# Patient Record
Sex: FEMALE | Race: BLACK OR AFRICAN AMERICAN
[De-identification: names, ages, dates, MRNs, and addresses within clinical notes are randomized per-mention and may not be internally consistent; named-entity substitution may affect disease eponyms.]

---

## 2018-09-18 ENCOUNTER — HOSPITAL ENCOUNTER (INPATIENT)
Dept: HOSPITAL 54 - GPS | Age: 68
LOS: 9 days | Discharge: HOME | DRG: 885 | End: 2018-09-27
Admitting: PSYCHIATRY & NEUROLOGY
Payer: COMMERCIAL

## 2018-09-18 VITALS — HEIGHT: 67 IN | BODY MASS INDEX: 20.72 KG/M2 | WEIGHT: 132 LBS

## 2018-09-18 DIAGNOSIS — F41.9: ICD-10-CM

## 2018-09-18 DIAGNOSIS — I10: ICD-10-CM

## 2018-09-18 DIAGNOSIS — Z88.0: ICD-10-CM

## 2018-09-18 DIAGNOSIS — Z59.0: ICD-10-CM

## 2018-09-18 DIAGNOSIS — E78.5: ICD-10-CM

## 2018-09-18 DIAGNOSIS — Z91.14: ICD-10-CM

## 2018-09-18 DIAGNOSIS — Z22.322: ICD-10-CM

## 2018-09-18 DIAGNOSIS — F01.50: ICD-10-CM

## 2018-09-18 DIAGNOSIS — R45.1: ICD-10-CM

## 2018-09-18 DIAGNOSIS — F25.0: Primary | ICD-10-CM

## 2018-09-18 PROCEDURE — A6402 STERILE GAUZE <= 16 SQ IN: HCPCS

## 2018-09-18 PROCEDURE — Z7610: HCPCS

## 2018-09-18 SDOH — ECONOMIC STABILITY - HOUSING INSECURITY: HOMELESSNESS: Z59.0

## 2018-09-19 VITALS — SYSTOLIC BLOOD PRESSURE: 100 MMHG | DIASTOLIC BLOOD PRESSURE: 52 MMHG

## 2018-09-19 VITALS — SYSTOLIC BLOOD PRESSURE: 162 MMHG | DIASTOLIC BLOOD PRESSURE: 82 MMHG

## 2018-09-19 VITALS — DIASTOLIC BLOOD PRESSURE: 72 MMHG | SYSTOLIC BLOOD PRESSURE: 133 MMHG

## 2018-09-19 VITALS — DIASTOLIC BLOOD PRESSURE: 82 MMHG | SYSTOLIC BLOOD PRESSURE: 168 MMHG

## 2018-09-19 LAB
ALBUMIN SERPL BCP-MCNC: 3.3 G/DL (ref 3.4–5)
ALP SERPL-CCNC: 93 U/L (ref 46–116)
ALT SERPL W P-5'-P-CCNC: 31 U/L (ref 12–78)
AST SERPL W P-5'-P-CCNC: 41 U/L (ref 15–37)
BILIRUB SERPL-MCNC: 0.2 MG/DL (ref 0.2–1)
BUN SERPL-MCNC: 21 MG/DL (ref 7–18)
CALCIUM SERPL-MCNC: 9.3 MG/DL (ref 8.5–10.1)
CHLORIDE SERPL-SCNC: 102 MMOL/L (ref 98–107)
CO2 SERPL-SCNC: 31 MMOL/L (ref 21–32)
CREAT SERPL-MCNC: 0.7 MG/DL (ref 0.6–1.3)
GLUCOSE SERPL-MCNC: 83 MG/DL (ref 74–106)
POTASSIUM SERPL-SCNC: 4.5 MMOL/L (ref 3.5–5.1)
PROT SERPL-MCNC: 7.6 G/DL (ref 6.4–8.2)
SODIUM SERPL-SCNC: 138 MMOL/L (ref 136–145)

## 2018-09-19 RX ADMIN — TEMAZEPAM PRN MG: 7.5 CAPSULE ORAL at 22:19

## 2018-09-19 RX ADMIN — DIVALPROEX SODIUM SCH MG: 250 TABLET, DELAYED RELEASE ORAL at 19:47

## 2018-09-19 RX ADMIN — LORAZEPAM PRN MG: 0.5 TABLET ORAL at 20:37

## 2018-09-19 RX ADMIN — BENZTROPINE MESYLATE SCH MG: 1 TABLET ORAL at 13:04

## 2018-09-19 RX ADMIN — BENZTROPINE MESYLATE SCH MG: 1 TABLET ORAL at 16:21

## 2018-09-19 RX ADMIN — ALUMINUM HYDROXIDE, MAGNESIUM HYDROXIDE, AND SIMETHICONE PRN ML: 200; 200; 20 SUSPENSION ORAL at 19:31

## 2018-09-19 RX ADMIN — LORAZEPAM PRN MG: 0.5 TABLET ORAL at 10:07

## 2018-09-19 RX ADMIN — DIVALPROEX SODIUM SCH MG: 250 TABLET, DELAYED RELEASE ORAL at 13:00

## 2018-09-19 NOTE — NUR
ADMISSION NOTE:



ADMITTED DIRECTLY FROM [facility], BROUGHT IN BY PARAMEDICS, ADMITTED ON 5250 
FOR GD, PATIENT HAS NO PLAN TO PROVIDE FOR SHELTER, FOOD AND CLOTHING. PATIENT IS HOMELESS. 
PATIENT WAS PLACED ON THE DIONTE CHAIR DUE TO AGGRESSIVE BEHAVIOR, VERBALLY ABUSIVE, 
UNPREDICTABLE, LOUD. AWAKE, ALERT, ORIENTED X3-4, AMBULATORY, SKIN INTACT, RESPIRATION EVEN, 
BREATHING PATTERN NON-LABORED, NO ACUTE DISTRESS NOTED. BELONGINGS WERE INVENTORIED AND 
CHECKED FOR CONTRABAND. PATIENT IS UNDER THE PSYCHIATRIC CARE OF DR. UNGER AND MEDICAL 
CARE OF DR. MAY. MRSA SCREEN DONE. NO HOME MEDS REPORTED. NO FAMILY TO NOTIFY, PATIENT 
LIVES ALONE. BED LOCKED AND PLACED ON LOWEST POSITION. WILL CONTINUE TO MONITOR Q 15 MINS. 
TO MAINTAIN SAFETY.

## 2018-09-19 NOTE — NUR
JOSE STATED THAT MED RECON NOT DONE RIGHT. TOLD US TO FIND WHAT MEDS SHE IS ON. WE 
CALLED [facility] MEDICAL RECORD AND LEFT A MESSAGE. CALLED THE FACILITY SHE WAS AT AND 
LEFT A MESSAGE. WAITING FOR BOTH TO CALL BACK.

## 2018-09-19 NOTE — NUR
gps/rn notes

requested for medication to help sleep.alert, oriented, able to verbalize needs, in bed,

## 2018-09-19 NOTE — NUR
GPS/RN OPENING NOTES

RECEIVED PATIENT ALERT, AWAKE, AMBULATES NAD ABLE TO VERBALIZE NEEDS AT ALL TIMES, PROVIDED 
FLUIDS AND REFUSES TO TAKE DEPAKOTE PO MEDS BUT REQUESTED TO HAVE SOME MEDICATION MAALOX FOR 
STOMACH UPSET/GAS. RESPIRATIONS EVEN AND UNLABORED. SKIN WARM TO TOUCH. BEHAVIOR WITH NO S/S 
OF DISTRESS, RIGHT EYE BLIND, REQUIRE MONITORING, PATIENT DENIES PAIN AT THIS TIME, ATTEND 
TO NEEDS. WILL CONTINUE TO MONITOR, INSTRUCTED TO CALL FOR HELP.

## 2018-09-20 VITALS — SYSTOLIC BLOOD PRESSURE: 108 MMHG | DIASTOLIC BLOOD PRESSURE: 72 MMHG

## 2018-09-20 VITALS — SYSTOLIC BLOOD PRESSURE: 125 MMHG | DIASTOLIC BLOOD PRESSURE: 58 MMHG

## 2018-09-20 VITALS — SYSTOLIC BLOOD PRESSURE: 107 MMHG | DIASTOLIC BLOOD PRESSURE: 61 MMHG

## 2018-09-20 LAB
CHOLEST SERPL-MCNC: 169 MG/DL (ref ?–200)
HDLC SERPL-MCNC: 69 MG/DL (ref 40–60)
LDLC SERPL DIRECT ASSAY-MCNC: 104 MG/DL (ref 0–99)
TRIGL SERPL-MCNC: 59 MG/DL (ref 30–150)

## 2018-09-20 RX ADMIN — BENZTROPINE MESYLATE SCH MG: 1 TABLET ORAL at 16:35

## 2018-09-20 RX ADMIN — BENZTROPINE MESYLATE SCH MG: 1 TABLET ORAL at 07:51

## 2018-09-20 RX ADMIN — ALUMINUM HYDROXIDE, MAGNESIUM HYDROXIDE, AND SIMETHICONE PRN ML: 200; 200; 20 SUSPENSION ORAL at 04:02

## 2018-09-20 RX ADMIN — BENZTROPINE MESYLATE SCH MG: 1 TABLET ORAL at 13:21

## 2018-09-20 RX ADMIN — OXCARBAZEPINE SCH MG: 150 TABLET, FILM COATED ORAL at 16:35

## 2018-09-20 RX ADMIN — LORAZEPAM PRN MG: 0.5 TABLET ORAL at 04:40

## 2018-09-20 RX ADMIN — DIVALPROEX SODIUM SCH MG: 250 TABLET, DELAYED RELEASE ORAL at 09:00

## 2018-09-20 RX ADMIN — ACETAMINOPHEN PRN MG: 325 TABLET ORAL at 05:22

## 2018-09-20 RX ADMIN — LORAZEPAM PRN MG: 0.5 TABLET ORAL at 10:56

## 2018-09-20 RX ADMIN — LORAZEPAM PRN MG: 0.5 TABLET ORAL at 19:54

## 2018-09-20 RX ADMIN — MUPIROCIN SCH GM: 20 OINTMENT TOPICAL at 21:25

## 2018-09-20 RX ADMIN — OXCARBAZEPINE SCH MG: 150 TABLET, FILM COATED ORAL at 10:54

## 2018-09-20 NOTE — NUR
gps/rn notes

patient observe awake, verbalize needs to eat some snacks, informed and made aware about tv 
watching, coopertaive and able to do some pacing and reading but communicates with staff and 
charge nurse about opinions and feelings. discussed the plan for her to have her transfered 
to another room private by charge nurse and supervisor decision, made aware and agreed for 
the scott. requeted to have ativan as patient appears/observe inability to relax.

## 2018-09-20 NOTE — NUR
GPS/RN NOTES

PATIENT COMPLAINING OF BEING CONSTIPATED, REPORTED HAVING HARD STOOL AND DO NOT WANT TO BE 
CONSTIPATED, EDUCATED THE CONS/PROS OF TAKING MOM FOR RELIEF OF CONSTIPATION WITH NO BM FOR 
MORE THAN 3 DAYS. REQUESTED TO HAVE IT AS SHE REPORTED BEING CONSTIPATED.

## 2018-09-20 NOTE — NUR
RN NOTES



PATIENT ADMINISTERED HALOPERIDOL 2MG IM ON HER LEFT DELTOID AT 1130. BP /74 MMHG. 
WILL CONTINUE TO MONITOR.

## 2018-09-20 NOTE — NUR
RN NOTES



DR GARCIA  CAME TO UNIT AND INFORMED THAT PT IS POSITIVE FOR MRSA OF BOTH NARES. HE 
ORDERED TO START PT ON BACTROBAN OINTMENT TO  BOTH NARES Q 12HRS. WILL CONTINUE TO MONITOR

## 2018-09-20 NOTE — NUR
GPS/RN CLOSING NOTES



PATIENT ASLEEP IN BED AT THIS TIME, EASILY AWAKENS. 1:1 SITTER AT BEDSIDE. PT IS A/O X 2-3, 
SAME ABLE TO VERBALIZED NEEDS. DENIES HI/SI DURING SHIFT. ALL NEEDS AND CARE ATTENDED WELL. 
ON ROOM AIR, RESPIRATIONS EVEN AND UNLABORED, SKIN WARM TO TOUCH. ALL SAFETY MEASURES KEPT 
IN PLACE. BED IN LOW/LOCKED POSITION WITH SIDE RAILS UP 2X. WILL ENDORSE TO NIGHT SHIFT 
NURSE FOR SHERI.

## 2018-09-20 NOTE — NUR
GPS NOTES



RECEIVE PT FROM GPS 0701 WITH 1 RN AND 1:1 SITTER NURSE, TRANSFER TO ROOM 209 -1 GPS 
OVERFLOW. PT A/O X2, VERBALLY ABUSIVE, EDUCATED AND RE ORIENT PT. RESPIRATIONS EVEN AND 
UNLABORED. NOT IN DISTRESS, PT AMBULATORY, SAFETY MEASURES IN PLACE. WILL ENDORSE NEXT SHIFT 
CONTINUITY OF CARE

## 2018-09-20 NOTE — NUR
RN GPS NOTES



PATIENT ARRIVED TO UNIT @ 0701 AMBULATORY FROM GPS ACCOMPANIED BY GPS NURSE AND SITTER. 
ALERT AND ORIENTED X 2, CONFUSED. PT RE-ORIENTED TO UNIT, ROOM  AND STAFF. ON ROOM AIR, 
BREATHING EVEN AND UNLABORED. ALL SAFETY MEASURES IN PLACE. WILL CONTINUE TO MONITOR 
PATIENT.

## 2018-09-20 NOTE — NUR
PT WAS CONSTANTLY SCREAMING LOUDLY IN THE HALLWAY-VERBALLY ABUSIVE TO THE STAFF MAKING 
STATEMENTS THAT DOESN'T MAKE SENSE.CONSTANT SCREAMING -PT'S ROOMMATE WAS GETTING SICK AND 
HAVING FEVER AS WELL.ROOMMATE NEEDS TO BE TRANSFERRED TO A DIFFERENT ROOM.CALLED DR EUGENE 
AND MADE AWARE WITH ORDERS FOR HALDOL 2 MG IM X1.

## 2018-09-20 NOTE — NUR
INITIAL DISCHARGE PLAN: Patient wishes to be discharged to [facility] Address: [address], [place] [address] Phone: [phone]. KOKO will 
help form a safe and proper discharge in collaboration with MD.

## 2018-09-20 NOTE — NUR
RN NOTES



RECEIVED CALL FROM ROCHELLE BARROSO OF [facility] AND WAS 
INFORMED THAT PT IS POSITIVE OF MRSA OF BOTH NARES. CONTACT ISOLATION STARTED. WILL CONTINUE 
TO MONITOR.

## 2018-09-20 NOTE — NUR
gps/rn notes

patient was trasfered to [facility] floor 2 by cna and charge nurse and supervisor made aware 
as planned and agreed by patient. ambulatory. one on one sitter with patient for safety.

## 2018-09-21 VITALS — SYSTOLIC BLOOD PRESSURE: 118 MMHG | DIASTOLIC BLOOD PRESSURE: 62 MMHG

## 2018-09-21 VITALS — DIASTOLIC BLOOD PRESSURE: 69 MMHG | SYSTOLIC BLOOD PRESSURE: 129 MMHG

## 2018-09-21 RX ADMIN — LORAZEPAM PRN MG: 0.5 TABLET ORAL at 17:39

## 2018-09-21 RX ADMIN — LORAZEPAM PRN MG: 0.5 TABLET ORAL at 03:52

## 2018-09-21 RX ADMIN — TEMAZEPAM PRN MG: 7.5 CAPSULE ORAL at 00:36

## 2018-09-21 RX ADMIN — BENZTROPINE MESYLATE SCH MG: 1 TABLET ORAL at 07:51

## 2018-09-21 RX ADMIN — ACETAMINOPHEN PRN MG: 325 TABLET ORAL at 02:05

## 2018-09-21 RX ADMIN — LORAZEPAM PRN MG: 0.5 TABLET ORAL at 10:29

## 2018-09-21 RX ADMIN — BENZTROPINE MESYLATE SCH MG: 1 TABLET ORAL at 12:29

## 2018-09-21 RX ADMIN — TEMAZEPAM PRN MG: 7.5 CAPSULE ORAL at 21:56

## 2018-09-21 RX ADMIN — MUPIROCIN SCH GM: 20 OINTMENT TOPICAL at 08:45

## 2018-09-21 RX ADMIN — BENZTROPINE MESYLATE SCH MG: 1 TABLET ORAL at 17:39

## 2018-09-21 RX ADMIN — ACETAMINOPHEN PRN MG: 325 TABLET ORAL at 01:37

## 2018-09-21 RX ADMIN — MUPIROCIN SCH GM: 20 OINTMENT TOPICAL at 21:57

## 2018-09-21 RX ADMIN — OXCARBAZEPINE SCH MG: 150 TABLET, FILM COATED ORAL at 17:39

## 2018-09-21 RX ADMIN — OXCARBAZEPINE SCH MG: 150 TABLET, FILM COATED ORAL at 08:44

## 2018-09-21 RX ADMIN — ACETAMINOPHEN PRN MG: 325 TABLET ORAL at 14:43

## 2018-09-21 NOTE — NUR
RN NOTES



PATIENT  RESTLESS, TALKING LOUDLY  AND ANXIOUS. ATIVAN 0.5MG TAB GIVEN AT 1029. WILL 
CONTINUE TO MONITOR.

## 2018-09-21 NOTE — NUR
RN NOTES



PATIENT TRANSFERRED FROM M/S 2 ROOM 209-1 TO GPS ROOM 217-1 VIA WHEELCHAIR AT 1805. REPORT 
GIVEN TO NURSE AUGUSTE.

## 2018-09-21 NOTE — NUR
RN PAIN MANAGEMENT



PATIENT COMPLAINED OF MILD HEADACHE AND REQUESTED FOR TYLENOL. PRN TYLENOL 650 MG PO GIVEN 
AT  [address]. WILL CONTINUE TO MONITOR

## 2018-09-21 NOTE — NUR
GPS/RN-NOTES

RECEIVED REPORT FROM ALTHEA (RN) MS2 OVERFLOW. PATIENT AWAKE,ALERT/ORIENTED X3 UP IN THE 
WHEELCHAIR.PATIENT ABLE TO AMBULATE WITH STEADY GAIT. ALL BELONGINGS WAS PUT IN THE HIS  
CLOSET.

## 2018-09-21 NOTE — NUR
RN NOTES



PATIENT  NOTED ANXIOUS AND AGITATED. ATIVAN 0.5MG TAB GIVEN AT 1739. WILL CONTINUE TO 
MONITOR.

## 2018-09-21 NOTE — NUR
GPS/RN OPENING NOTES



PATIENT RECEIVED AWAKE AND WALKING AROUND IN HER ROOM. 1:1 SITTER PRESENT IN ROOM. A/O X 
2-3. ABLE TO COMMUNICATE VERBALLY, DENIES PAIN OR DISCOMFORTS AT THIS TIME. DENIES HI/SI. ON 
ROOM AIR, RESPIRATIONS EVEN AND UNLABORED, SKIN WARM TO TOUCH. CONTACT PRECAUTIONS FOR MRSA 
OF NARES MAINTAINED. ALL SAFETY MEASURES IN PLACE. BED IN LOW/LOCKED POSITION WITH SIDE 
RAILS UP 2X. WILL CONTINUE TO MONITOR PT.

## 2018-09-22 VITALS — DIASTOLIC BLOOD PRESSURE: 55 MMHG | SYSTOLIC BLOOD PRESSURE: 136 MMHG

## 2018-09-22 VITALS — DIASTOLIC BLOOD PRESSURE: 91 MMHG | SYSTOLIC BLOOD PRESSURE: 154 MMHG

## 2018-09-22 VITALS — DIASTOLIC BLOOD PRESSURE: 57 MMHG | SYSTOLIC BLOOD PRESSURE: 119 MMHG

## 2018-09-22 RX ADMIN — BENZTROPINE MESYLATE SCH MG: 1 TABLET ORAL at 13:19

## 2018-09-22 RX ADMIN — MUPIROCIN SCH GM: 20 OINTMENT TOPICAL at 11:22

## 2018-09-22 RX ADMIN — TEMAZEPAM PRN MG: 7.5 CAPSULE ORAL at 23:47

## 2018-09-22 RX ADMIN — BENZTROPINE MESYLATE SCH MG: 1 TABLET ORAL at 08:34

## 2018-09-22 RX ADMIN — ATORVASTATIN CALCIUM SCH MG: 10 TABLET, FILM COATED ORAL at 22:25

## 2018-09-22 RX ADMIN — ALUMINUM HYDROXIDE, MAGNESIUM HYDROXIDE, AND SIMETHICONE PRN ML: 200; 200; 20 SUSPENSION ORAL at 03:16

## 2018-09-22 RX ADMIN — OXCARBAZEPINE SCH MG: 150 TABLET, FILM COATED ORAL at 08:35

## 2018-09-22 RX ADMIN — MUPIROCIN SCH GM: 20 OINTMENT TOPICAL at 22:29

## 2018-09-22 RX ADMIN — LORAZEPAM PRN MG: 0.5 TABLET ORAL at 03:09

## 2018-09-22 RX ADMIN — BENZTROPINE MESYLATE SCH MG: 1 TABLET ORAL at 17:53

## 2018-09-22 RX ADMIN — OXCARBAZEPINE SCH MG: 150 TABLET, FILM COATED ORAL at 17:53

## 2018-09-22 RX ADMIN — OXCARBAZEPINE SCH MG: 150 TABLET, FILM COATED ORAL at 13:18

## 2018-09-23 VITALS — DIASTOLIC BLOOD PRESSURE: 73 MMHG | SYSTOLIC BLOOD PRESSURE: 121 MMHG

## 2018-09-23 VITALS — SYSTOLIC BLOOD PRESSURE: 124 MMHG | DIASTOLIC BLOOD PRESSURE: 49 MMHG

## 2018-09-23 VITALS — SYSTOLIC BLOOD PRESSURE: 138 MMHG | DIASTOLIC BLOOD PRESSURE: 82 MMHG

## 2018-09-23 RX ADMIN — OXCARBAZEPINE SCH MG: 150 TABLET, FILM COATED ORAL at 12:58

## 2018-09-23 RX ADMIN — BENZTROPINE MESYLATE SCH MG: 1 TABLET ORAL at 17:03

## 2018-09-23 RX ADMIN — OXCARBAZEPINE SCH MG: 150 TABLET, FILM COATED ORAL at 17:03

## 2018-09-23 RX ADMIN — ALUMINUM HYDROXIDE, MAGNESIUM HYDROXIDE, AND SIMETHICONE PRN ML: 200; 200; 20 SUSPENSION ORAL at 09:00

## 2018-09-23 RX ADMIN — LORAZEPAM PRN MG: 0.5 TABLET ORAL at 09:00

## 2018-09-23 RX ADMIN — MUPIROCIN SCH GM: 20 OINTMENT TOPICAL at 09:01

## 2018-09-23 RX ADMIN — BENZTROPINE MESYLATE SCH MG: 1 TABLET ORAL at 12:10

## 2018-09-23 RX ADMIN — OXCARBAZEPINE SCH MG: 150 TABLET, FILM COATED ORAL at 09:00

## 2018-09-23 RX ADMIN — TEMAZEPAM PRN MG: 7.5 CAPSULE ORAL at 22:02

## 2018-09-23 RX ADMIN — OXCARBAZEPINE SCH MG: 150 TABLET, FILM COATED ORAL at 12:10

## 2018-09-23 RX ADMIN — LORAZEPAM PRN MG: 0.5 TABLET ORAL at 23:58

## 2018-09-23 RX ADMIN — BENZTROPINE MESYLATE SCH MG: 1 TABLET ORAL at 12:58

## 2018-09-23 RX ADMIN — ACETAMINOPHEN PRN MG: 325 TABLET ORAL at 14:48

## 2018-09-23 RX ADMIN — BENZTROPINE MESYLATE SCH MG: 1 TABLET ORAL at 09:00

## 2018-09-23 RX ADMIN — MUPIROCIN SCH GM: 20 OINTMENT TOPICAL at 21:29

## 2018-09-23 RX ADMIN — ATORVASTATIN CALCIUM SCH MG: 10 TABLET, FILM COATED ORAL at 21:29

## 2018-09-24 VITALS — DIASTOLIC BLOOD PRESSURE: 91 MMHG | SYSTOLIC BLOOD PRESSURE: 145 MMHG

## 2018-09-24 VITALS — DIASTOLIC BLOOD PRESSURE: 58 MMHG | SYSTOLIC BLOOD PRESSURE: 123 MMHG

## 2018-09-24 VITALS — DIASTOLIC BLOOD PRESSURE: 71 MMHG | SYSTOLIC BLOOD PRESSURE: 112 MMHG

## 2018-09-24 RX ADMIN — BENZTROPINE MESYLATE SCH MG: 1 TABLET ORAL at 08:23

## 2018-09-24 RX ADMIN — OXCARBAZEPINE SCH MG: 150 TABLET, FILM COATED ORAL at 12:02

## 2018-09-24 RX ADMIN — ALUMINUM HYDROXIDE, MAGNESIUM HYDROXIDE, AND SIMETHICONE PRN ML: 200; 200; 20 SUSPENSION ORAL at 21:05

## 2018-09-24 RX ADMIN — MUPIROCIN SCH APPLIC: 20 OINTMENT TOPICAL at 21:08

## 2018-09-24 RX ADMIN — BENZTROPINE MESYLATE SCH MG: 1 TABLET ORAL at 12:02

## 2018-09-24 RX ADMIN — OXCARBAZEPINE SCH MG: 150 TABLET, FILM COATED ORAL at 08:23

## 2018-09-24 RX ADMIN — ACETAMINOPHEN PRN MG: 325 TABLET ORAL at 02:23

## 2018-09-24 RX ADMIN — TEMAZEPAM PRN MG: 7.5 CAPSULE ORAL at 21:05

## 2018-09-24 RX ADMIN — OXCARBAZEPINE SCH MG: 150 TABLET, FILM COATED ORAL at 16:27

## 2018-09-24 RX ADMIN — ALUMINUM HYDROXIDE, MAGNESIUM HYDROXIDE, AND SIMETHICONE PRN ML: 200; 200; 20 SUSPENSION ORAL at 05:55

## 2018-09-24 RX ADMIN — ATORVASTATIN CALCIUM SCH MG: 10 TABLET, FILM COATED ORAL at 21:05

## 2018-09-24 RX ADMIN — LORAZEPAM PRN MG: 0.5 TABLET ORAL at 10:56

## 2018-09-24 RX ADMIN — MUPIROCIN SCH GM: 20 OINTMENT TOPICAL at 08:23

## 2018-09-24 RX ADMIN — ALUMINUM HYDROXIDE, MAGNESIUM HYDROXIDE, AND SIMETHICONE PRN ML: 200; 200; 20 SUSPENSION ORAL at 10:24

## 2018-09-24 RX ADMIN — BENZTROPINE MESYLATE SCH MG: 1 TABLET ORAL at 16:27

## 2018-09-24 NOTE — NUR
KOKO had a face to face meeting with Promise Ponce LCSW, [facility] [facility] 
Engagement  with the Department of Mental Health. Promise stated that she has 
been working with pt for many months now in [facility] and has secured a bed for her at an SRO 
in [facility]. Promise also met with pts current treating psychiatrist and discussed the 
possibility of Invega Sustenna injection. Per Promise, [facility] wishes for pt to be on a 
monthly injection to better assist pt with her mental illness and help her obtain/maintain 
housing. Promise informed KOKO that pt has agreed to SRO housing and agreed to be 
transported from hospital to [facility] by Promise. KOKO will collaborate with Promise and MD for 
a safe and proper discharge plan.  

-------------------------------------------------------------------------------

Addendum: 09/24/18 at 1216 by SHIVANI SUTHERLAND

-------------------------------------------------------------------------------

 Promise Ponce LCSW, [facility] Eng[facility]  with the 
Department of Mental Health [phone].

## 2018-09-24 NOTE — NUR
GPS RN NOTE:



PATIENT C/O BITE ON HER RIGHT AND LEFT LOWER CHIN, FIRST AID DONE, ICE PACK APPLIED AND 
EFFECTIVE POST 30 MINS. RASH SUBSIDED. WILL CONTINUE TO MONITOR [id number] FOR SAFETY

## 2018-09-25 VITALS — SYSTOLIC BLOOD PRESSURE: 140 MMHG | DIASTOLIC BLOOD PRESSURE: 73 MMHG

## 2018-09-25 VITALS — DIASTOLIC BLOOD PRESSURE: 72 MMHG | SYSTOLIC BLOOD PRESSURE: 130 MMHG

## 2018-09-25 VITALS — DIASTOLIC BLOOD PRESSURE: 63 MMHG | SYSTOLIC BLOOD PRESSURE: 146 MMHG

## 2018-09-25 RX ADMIN — MUPIROCIN SCH APPLIC: 20 OINTMENT TOPICAL at 21:41

## 2018-09-25 RX ADMIN — OXCARBAZEPINE SCH MG: 150 TABLET, FILM COATED ORAL at 16:35

## 2018-09-25 RX ADMIN — HALOPERIDOL SCH MG: 1 TABLET ORAL at 21:41

## 2018-09-25 RX ADMIN — ATORVASTATIN CALCIUM SCH MG: 10 TABLET, FILM COATED ORAL at 21:40

## 2018-09-25 RX ADMIN — ACETAMINOPHEN PRN MG: 325 TABLET ORAL at 04:18

## 2018-09-25 RX ADMIN — ALUMINUM HYDROXIDE, MAGNESIUM HYDROXIDE, AND SIMETHICONE PRN ML: 200; 200; 20 SUSPENSION ORAL at 05:34

## 2018-09-25 RX ADMIN — ALUMINUM HYDROXIDE, MAGNESIUM HYDROXIDE, AND SIMETHICONE PRN ML: 200; 200; 20 SUSPENSION ORAL at 10:41

## 2018-09-25 RX ADMIN — LORAZEPAM PRN MG: 0.5 TABLET ORAL at 00:57

## 2018-09-25 RX ADMIN — BENZTROPINE MESYLATE SCH MG: 1 TABLET ORAL at 08:45

## 2018-09-25 RX ADMIN — MUPIROCIN SCH APPLIC: 20 OINTMENT TOPICAL at 08:46

## 2018-09-25 RX ADMIN — BENZTROPINE MESYLATE SCH MG: 1 TABLET ORAL at 12:46

## 2018-09-25 RX ADMIN — OXCARBAZEPINE SCH MG: 150 TABLET, FILM COATED ORAL at 08:45

## 2018-09-25 RX ADMIN — BENZTROPINE MESYLATE SCH MG: 1 TABLET ORAL at 16:35

## 2018-09-25 RX ADMIN — LORAZEPAM PRN MG: 0.5 TABLET ORAL at 08:45

## 2018-09-25 RX ADMIN — HALOPERIDOL SCH MG: 1 TABLET ORAL at 15:43

## 2018-09-25 RX ADMIN — OXCARBAZEPINE SCH MG: 150 TABLET, FILM COATED ORAL at 12:46

## 2018-09-25 RX ADMIN — TEMAZEPAM PRN MG: 7.5 CAPSULE ORAL at 21:41

## 2018-09-25 NOTE — NUR
KOKO contacted Promise Ponce, LCSW, [facility] Engagement  
with the Department of Mental Health [phone] to arrange transportation for pts 
discharge on Thursday 9/27/18. Promise will  pt and transport to SRO housing at 
11:00am.

## 2018-09-25 NOTE — NUR
GPS/RN

PATIENT IS AGITATED, ANXIOUS AND VERBALLY ABUSIVE, ADMINISTERED ATIVAN 0.5 MG , WILL 
CONTINUE TO MONITOR.

## 2018-09-26 VITALS — SYSTOLIC BLOOD PRESSURE: 112 MMHG | DIASTOLIC BLOOD PRESSURE: 72 MMHG

## 2018-09-26 VITALS — DIASTOLIC BLOOD PRESSURE: 67 MMHG | SYSTOLIC BLOOD PRESSURE: 139 MMHG

## 2018-09-26 VITALS — SYSTOLIC BLOOD PRESSURE: 135 MMHG | DIASTOLIC BLOOD PRESSURE: 62 MMHG

## 2018-09-26 RX ADMIN — ALUMINUM HYDROXIDE, MAGNESIUM HYDROXIDE, AND SIMETHICONE PRN ML: 200; 200; 20 SUSPENSION ORAL at 02:05

## 2018-09-26 RX ADMIN — ACETAMINOPHEN PRN MG: 325 TABLET ORAL at 02:05

## 2018-09-26 RX ADMIN — TEMAZEPAM PRN MG: 7.5 CAPSULE ORAL at 22:12

## 2018-09-26 RX ADMIN — HALOPERIDOL SCH MG: 1 TABLET ORAL at 08:16

## 2018-09-26 RX ADMIN — BENZTROPINE MESYLATE SCH MG: 1 TABLET ORAL at 08:16

## 2018-09-26 RX ADMIN — LORAZEPAM PRN MG: 1 TABLET ORAL at 22:12

## 2018-09-26 RX ADMIN — BENZTROPINE MESYLATE SCH MG: 1 TABLET ORAL at 13:03

## 2018-09-26 RX ADMIN — BENZTROPINE MESYLATE SCH MG: 1 TABLET ORAL at 16:42

## 2018-09-26 RX ADMIN — OXCARBAZEPINE SCH MG: 150 TABLET, FILM COATED ORAL at 16:42

## 2018-09-26 RX ADMIN — LORAZEPAM PRN MG: 1 TABLET ORAL at 11:23

## 2018-09-26 RX ADMIN — MUPIROCIN SCH APPLIC: 20 OINTMENT TOPICAL at 08:41

## 2018-09-26 RX ADMIN — MUPIROCIN SCH APPLIC: 20 OINTMENT TOPICAL at 21:51

## 2018-09-26 RX ADMIN — ACETAMINOPHEN PRN MG: 325 TABLET ORAL at 18:02

## 2018-09-26 RX ADMIN — ATORVASTATIN CALCIUM SCH MG: 10 TABLET, FILM COATED ORAL at 22:13

## 2018-09-26 RX ADMIN — OXCARBAZEPINE SCH MG: 150 TABLET, FILM COATED ORAL at 08:16

## 2018-09-26 RX ADMIN — LORAZEPAM PRN MG: 0.5 TABLET ORAL at 01:01

## 2018-09-26 RX ADMIN — OXCARBAZEPINE SCH MG: 150 TABLET, FILM COATED ORAL at 13:02

## 2018-09-26 NOTE — NUR
NURSING NOTE

PT AGITATED, YELLING, CUSSING AT OTHER PTS AND STAFF, PACING THE HALLWAY, CALLED DR. UNGER 
FOR ATIVAN 1MG PO. ORDER WAS OBTAINED, ADMINISTERED ATIVAN 1MG PO @1123. WILL CONTINUE TO 
MONITOR FOR SAFETY AND BEHAVIOR.

## 2018-09-27 RX ADMIN — OXCARBAZEPINE SCH MG: 150 TABLET, FILM COATED ORAL at 08:13

## 2018-09-27 RX ADMIN — BENZTROPINE MESYLATE SCH MG: 1 TABLET ORAL at 08:14

## 2018-09-27 RX ADMIN — MUPIROCIN SCH APPLIC: 20 OINTMENT TOPICAL at 09:30

## 2018-09-27 NOTE — NUR
DISCHARGE NOTE: Pt was discharged at 11:45am to [facility] [address] [place], [address]. Pt was picked up and transported by Department of Mental Health social 
worker Promise Garcia [phone] via private vehicle. Pt denied visual/auditory 
hallucinations and denied suicidal/homicidal ideations. Pts mood was pleasant with congruent 
affect. For smoking cessation, patient was referred to the American Cancer Society 
[phone] or American Lung Association 800-Lung-USA. Patient was provided referrals to 
address her substance use. Patient was referred to [facility]: [address] [place] [address] Tel: [phone] pt was encouraged to report for an 
Intake on Friday September 28, 2018 before 5:00pm. Additional resources included [facility] 
[address] [place] [address] [phone] and [facility] 
[address] [place] [address] [phone]. Pt [facility] [address] [phone] and continue 
treatment with Department of Mental Health. Pt was also provided with referral to [facility] [address] [address] [phone]. The multidisciplinary 
exitcare form was done, printed, signed, and given to the patient.

## 2018-09-27 NOTE — NUR
GPS/RN

PATIENT REFUSED TO SHOWER, ENCOURAGED X 3, EXPLAINED RISKS AND BENEFITS, WILL CONTINUE TO 
ENCOURAGE TO SHOWER AND ALLOW STAFF TO ASSIST WITH CARE.

## 2018-09-27 NOTE — NUR
GPS/RN

PATIENT CLEARED FOR DISCHARGE BY DR BARRERA AND DR GARNER. MEDICATIONS RECONCILED AND 
PRESCRIPTIONS CALLED INTO PHARMACY AND FAXED TO PHARMACY. SPOKE WITH MARJAN. 
MEDICATIONS, EXIT CARE AND AFTERCARE PLAN EXPLAINED TANG PATIENT, VERBALIZED UNDERSTANDING. 
PATIENT REFUSED TO SIGNS D/C FORMS, COSIGNED BY 2 RN. REFUSED D/C PHOTO X 3, EXPLAINED RISKS 
AND BENEFITS. REFUSED TO SHOWER PRIOR TO DISCHARGE DESPITE ENCOURAGEMENT.  BELONGINGS 
RETURNED TO PATIENT. PATIENT DENIES SI/HI/AVH, PSYCHIATRIC TREATMENT PLANS MET. LEFT UNIT 
CALM, NO DISTRESS NOTED WITH TRANSPORT AND CNA AT SIDE.

## 2018-10-01 NOTE — NUR
UR NOTE: KOKO faxed discharge summary/discharge medications to Doris from [facility]: 
[phone] P: [phone] ex [phone].

## 2018-10-01 NOTE — NUR
Department of Mental Health  Promise Garcia [phone] contacted SW stating 
that pt was prescribed the incorrect medication and had been attempting to get it fixed 
since Thursday of last week. SW stated that she was unaware and would speak to charge nurse 
regarding her issue. [facility] KOKO was upset stating pt hadn't been taking her medication since her 
discharge and was blaming it on Rx. KOKO transferred call to charge nurse.

## 2019-03-22 ENCOUNTER — HOSPITAL ENCOUNTER (INPATIENT)
Dept: HOSPITAL 12 - ER | Age: 69
LOS: 13 days | Discharge: HOME | DRG: 885 | End: 2019-04-04
Attending: INTERNAL MEDICINE
Payer: MEDICARE

## 2019-03-22 VITALS — WEIGHT: 157 LBS | HEIGHT: 65 IN | BODY MASS INDEX: 26.16 KG/M2

## 2019-03-22 VITALS — DIASTOLIC BLOOD PRESSURE: 76 MMHG | SYSTOLIC BLOOD PRESSURE: 137 MMHG

## 2019-03-22 DIAGNOSIS — E86.1: ICD-10-CM

## 2019-03-22 DIAGNOSIS — F12.10: ICD-10-CM

## 2019-03-22 DIAGNOSIS — D68.59: ICD-10-CM

## 2019-03-22 DIAGNOSIS — F03.90: ICD-10-CM

## 2019-03-22 DIAGNOSIS — Z74.09: ICD-10-CM

## 2019-03-22 DIAGNOSIS — F15.11: ICD-10-CM

## 2019-03-22 DIAGNOSIS — Z87.891: ICD-10-CM

## 2019-03-22 DIAGNOSIS — R30.0: ICD-10-CM

## 2019-03-22 DIAGNOSIS — T22.00XS: ICD-10-CM
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
## 2019-03-22 DIAGNOSIS — Z59.0: ICD-10-CM

## 2019-03-22 DIAGNOSIS — D64.9: ICD-10-CM

## 2019-03-22 DIAGNOSIS — Z98.890: ICD-10-CM

## 2019-03-22 DIAGNOSIS — F14.10: ICD-10-CM

## 2019-03-22 DIAGNOSIS — E87.1: ICD-10-CM

## 2019-03-22 DIAGNOSIS — J44.9: ICD-10-CM

## 2019-03-22 DIAGNOSIS — F25.0: Primary | ICD-10-CM

## 2019-03-22 PROCEDURE — A4663 DIALYSIS BLOOD PRESSURE CUFF: HCPCS

## 2019-03-22 SDOH — ECONOMIC STABILITY - HOUSING INSECURITY: HOMELESSNESS: Z59.0

## 2019-03-22 NOTE — NUR
Gps/Lvn- Admitted from ER via wheel chair, in no sign of any distress, anxious, labile mood, 
speech garble, unclear , gets irritable when questions asked  and to clarified words she 
uttered. Patient noted urgency in urinations 3x to get up to go to the bathroom. Refusal 
noted to answer questions , gets up and walks away. Showed room, unable to  do body check , 
patient refused to cooperate.Right eye blind, missing teeth , no dentures.

## 2019-03-22 NOTE — NUR
PT IS IN ROOM #1A UNDER DIRECT OBSERVATION OF LISA HERNANDEZ AND SECURITY AT THE 
BEDSIDE. CONTINUE TO MONITOR THE PT.

## 2019-03-22 NOTE — NUR
Received patient asleep in room, bed in low position and locked. Noise and lights subdued. 
Will continue to monitor.

## 2019-03-23 VITALS — DIASTOLIC BLOOD PRESSURE: 65 MMHG | SYSTOLIC BLOOD PRESSURE: 102 MMHG

## 2019-03-23 VITALS — DIASTOLIC BLOOD PRESSURE: 89 MMHG | SYSTOLIC BLOOD PRESSURE: 129 MMHG

## 2019-03-23 LAB
APPEARANCE UR: (no result)
BILIRUB UR QL STRIP: (no result)
CHOLEST SERPL-MCNC: 158 MG/DL (ref ?–200)
COLOR UR: (no result)
DEPRECATED SQUAMOUS URNS QL MICRO: (no result) /HPF
GLUCOSE UR STRIP-MCNC: NEGATIVE MG/DL
HDLC SERPL-MCNC: 73 MG/DL (ref 40–60)
HGB UR QL STRIP: NEGATIVE
KETONES UR STRIP-MCNC: (no result) MG/DL
LEUKOCYTE ESTERASE UR QL STRIP: NEGATIVE
MUCOUS THREADS URNS QL MICRO: (no result) /LPF
NITRITE UR QL STRIP: NEGATIVE
PH UR STRIP: 6.5 [PH] (ref 5–8)
SP GR UR STRIP: >=1.03 (ref 1–1.03)
TRIGL SERPL-MCNC: 50 MG/DL (ref 30–150)
UROBILINOGEN UR STRIP-MCNC: 0.2 E.U./DL
WBC #/AREA URNS HPF: (no result) /HPF
WBC #/AREA URNS HPF: (no result) /HPF (ref 0–3)

## 2019-03-23 RX ADMIN — Medication SCH MG: at 17:07

## 2019-03-23 RX ADMIN — ACETAMINOPHEN PRN MG: 325 TABLET ORAL at 16:15

## 2019-03-23 NOTE — NUR
RECEIVED PATIENT AWAKE ON BED, SELF CARE, DENIES SI AND HI, PATIENT ABLE TO WALK MAKES HER 
NEEDS KNOWN, ASK FOR PAIN MEDICATION, FOR GENERALIZED BODY PAIN, , PATIENT WAS RELIEF OF 
PAIN AFTER MEDICATION, PATIENT COMPLIANT TO MEDICATION, SEEN AND EXAMINED BY DR. NATH, 
WITH ORDERS MADE AND CARRIED OUT URINE SPECIMEN COLLECTED AND SENT TO LABS,WILL CONTINUE TO 
MONITOR

## 2019-03-23 NOTE — NUR
Patient slept well throughout the night. No episode of agitation noted. Patient is alert and 
oriented x 1. Engages in conversation but speech is garbled. Attended all needs. Ensured 
safety and comfort.

## 2019-03-24 VITALS — DIASTOLIC BLOOD PRESSURE: 95 MMHG | SYSTOLIC BLOOD PRESSURE: 132 MMHG

## 2019-03-24 VITALS — DIASTOLIC BLOOD PRESSURE: 66 MMHG | SYSTOLIC BLOOD PRESSURE: 123 MMHG

## 2019-03-24 LAB
ALP SERPL-CCNC: 96 U/L (ref 50–136)
ALT SERPL W/O P-5'-P-CCNC: 27 U/L (ref 14–59)
AST SERPL-CCNC: 29 U/L (ref 15–37)
BASOPHILS # BLD AUTO: 0 K/UL (ref 0–8)
BASOPHILS NFR BLD AUTO: 0.2 % (ref 0–2)
BILIRUB SERPL-MCNC: 0.5 MG/DL (ref 0.2–1)
BUN SERPL-MCNC: 8 MG/DL (ref 7–18)
CHLORIDE SERPL-SCNC: 97 MMOL/L (ref 98–107)
CO2 SERPL-SCNC: 30 MMOL/L (ref 21–32)
CREAT SERPL-MCNC: 0.6 MG/DL (ref 0.6–1.3)
EOSINOPHIL # BLD AUTO: 0 K/UL (ref 0–0.7)
EOSINOPHIL NFR BLD AUTO: 0.4 % (ref 0–7)
GLUCOSE SERPL-MCNC: 102 MG/DL (ref 74–106)
HCT VFR BLD AUTO: 35.2 % (ref 31.2–41.9)
HGB BLD-MCNC: 10.5 G/DL (ref 10.9–14.3)
LYMPHOCYTES # BLD AUTO: 2.8 K/UL (ref 20–40)
LYMPHOCYTES NFR BLD AUTO: 32.4 % (ref 20.5–51.5)
LYMPHOCYTES NFR BLD MANUAL: 28 % (ref 20–40)
MAGNESIUM SERPL-MCNC: 2 MG/DL (ref 1.8–2.4)
MCH RBC QN AUTO: 19.2 UUG (ref 24.7–32.8)
MCHC RBC AUTO-ENTMCNC: 30 G/DL (ref 32.3–35.6)
MCV RBC AUTO: 64.5 FL (ref 75.5–95.3)
MONOCYTES # BLD AUTO: 0.8 K/UL (ref 2–10)
MONOCYTES NFR BLD AUTO: 9 % (ref 0–11)
MONOCYTES NFR BLD MANUAL: 10 % (ref 2–10)
NEUTROPHILS # BLD AUTO: 4.9 K/UL (ref 1.8–8.9)
NEUTROPHILS NFR BLD AUTO: 58 % (ref 38.5–71.5)
NEUTS SEG NFR BLD MANUAL: 62 % (ref 42–75)
PHOSPHATE SERPL-MCNC: 3.5 MG/DL (ref 2.5–4.9)
PLATELET # BLD AUTO: 366 K/UL (ref 179–408)
POTASSIUM SERPL-SCNC: 4 MMOL/L (ref 3.5–5.1)
RBC # BLD AUTO: 5.46 MIL/UL (ref 3.63–4.92)
TSH SERPL DL<=0.005 MIU/L-ACNC: 0.57 MIU/ML (ref 0.36–3.74)
WBC # BLD AUTO: 8.5 K/UL (ref 3.8–11.8)
WS STN SPEC: 7.8 G/DL (ref 6.4–8.2)

## 2019-03-24 RX ADMIN — ALUMINUM HYDROXIDE, MAGNESIUM HYDROXIDE, AND SIMETHICONE PRN ML: 200; 200; 20 SUSPENSION ORAL at 18:41

## 2019-03-24 RX ADMIN — Medication SCH MG: at 16:31

## 2019-03-24 RX ADMIN — Medication SCH MG: at 08:17

## 2019-03-24 RX ADMIN — ACETAMINOPHEN PRN MG: 325 TABLET ORAL at 06:03

## 2019-03-24 RX ADMIN — ACETAMINOPHEN PRN MG: 325 TABLET ORAL at 21:06

## 2019-03-24 RX ADMIN — LORAZEPAM PRN MG: 0.5 TABLET ORAL at 16:38

## 2019-03-24 NOTE — NUR
resting at beginning of the shift. aaox3-4 needs attended. ambulates

to the BR, voiding well. VSS compliant with meds and care. Tolerted 

po meds well. denies any pain nor any discomfort. will monitor patient. 

fall precautions maintained. assissted back to the BR. voiding well, 

compliant with meds and care.

## 2019-03-24 NOTE — NUR
End of shift report: Patient laying in bed resting, easily arousable alert and oriented. 
Denies any SI/HI, safety checks implemented throughout shift. Patient is easily agitated and 
begins cursing at staff. Patient compliant with medications at scheduled times. Requested 
Ativan for anxiety, given at 1640. Verbalized relief after medication.

## 2019-03-25 RX ADMIN — Medication SCH MG: at 17:16

## 2019-03-25 RX ADMIN — Medication SCH MG: at 08:01

## 2019-03-25 RX ADMIN — DIVALPROEX SODIUM SCH MG: 125 TABLET, DELAYED RELEASE ORAL at 17:16

## 2019-03-25 RX ADMIN — LORAZEPAM PRN MG: 0.5 TABLET ORAL at 17:48

## 2019-03-25 RX ADMIN — ACETAMINOPHEN PRN MG: 325 TABLET ORAL at 11:29

## 2019-03-25 RX ADMIN — LORAZEPAM PRN MG: 0.5 TABLET ORAL at 05:28

## 2019-03-25 RX ADMIN — LORAZEPAM PRN MG: 0.5 TABLET ORAL at 11:31

## 2019-03-25 RX ADMIN — DIVALPROEX SODIUM SCH MG: 125 TABLET, DELAYED RELEASE ORAL at 21:13

## 2019-03-25 RX ADMIN — DIVALPROEX SODIUM SCH MG: 125 TABLET, DELAYED RELEASE ORAL at 15:21

## 2019-03-25 RX ADMIN — ACETAMINOPHEN PRN MG: 325 TABLET ORAL at 18:48

## 2019-03-25 RX ADMIN — ACETAMINOPHEN PRN MG: 325 TABLET ORAL at 03:13

## 2019-03-25 NOTE — NUR
patient gets very agitated and screaming all over.

refused to get back in bed. ativan 0.5 mg given 

as needed. will monitor patient for more behavioral 

issues.

## 2019-03-25 NOTE — NUR
patient slept only for 5 hours ambulating in the hallway pacing back and 

forth. Patient calmed down after ativan. Needs attended. Will monitor patient.

## 2019-03-25 NOTE — NUR
Initial Discharge Instructions:  The patient is currently homeless. Patient became agitated 
and irritable during SS assessment and refused to participate. SW Intern will continue to 
contact Asmita Rosario the pt.s Homeless Outreach Engagement SW ([phone]). JUAN C Intern 
and other SW will continue to collaborate with interdisciplinary team to ensure safe and 
proper discharge planning.

## 2019-03-25 NOTE — NUR
Patient continue to talk loud to staff. throwing things on the floor like her tray and 
medicine cup despite of education. Called security twice for behavior. Ativan 0.5mg given 
around 549pm.

## 2019-03-26 VITALS — DIASTOLIC BLOOD PRESSURE: 70 MMHG | SYSTOLIC BLOOD PRESSURE: 107 MMHG

## 2019-03-26 RX ADMIN — DIVALPROEX SODIUM SCH MG: 125 TABLET, DELAYED RELEASE ORAL at 09:01

## 2019-03-26 RX ADMIN — DIVALPROEX SODIUM SCH MG: 250 TABLET, DELAYED RELEASE ORAL at 12:35

## 2019-03-26 RX ADMIN — ACETAMINOPHEN PRN MG: 325 TABLET ORAL at 01:50

## 2019-03-26 RX ADMIN — ALUMINUM HYDROXIDE, MAGNESIUM HYDROXIDE, AND SIMETHICONE PRN ML: 200; 200; 20 SUSPENSION ORAL at 05:18

## 2019-03-26 RX ADMIN — LORAZEPAM PRN MG: 0.5 TABLET ORAL at 04:26

## 2019-03-26 RX ADMIN — ACETAMINOPHEN PRN MG: 325 TABLET ORAL at 20:18

## 2019-03-26 RX ADMIN — DIVALPROEX SODIUM SCH MG: 250 TABLET, DELAYED RELEASE ORAL at 20:18

## 2019-03-26 RX ADMIN — ACETAMINOPHEN PRN MG: 325 TABLET ORAL at 15:56

## 2019-03-26 RX ADMIN — LORAZEPAM PRN MG: 0.5 TABLET ORAL at 11:02

## 2019-03-26 RX ADMIN — Medication SCH MG: at 16:41

## 2019-03-26 RX ADMIN — ACETAMINOPHEN PRN MG: 325 TABLET ORAL at 09:01

## 2019-03-26 RX ADMIN — LORAZEPAM PRN MG: 0.5 TABLET ORAL at 16:07

## 2019-03-26 RX ADMIN — Medication SCH MG: at 09:01

## 2019-03-26 NOTE — NUR
patient's agitation comes and goes. yelling and talking loud most of the time. 

compliant with meds. on ativan, given as needed. ambulating to and fro in 

the hallway. needs attended. will monitor patient.refused vital signs.

## 2019-03-26 NOTE — NUR
received patient awake  pacing in the nurses station, patient easily irritable, shouting 
screaming most of the time, seen angry and argumentative with staff,  gave prn medication as 
ordered , patient calm down in pm,during dinner time patient pacing and irritated, prn meds 
given no sob no discomfort noted, patient remains free of injury, will continue monitor

## 2019-03-27 VITALS — DIASTOLIC BLOOD PRESSURE: 58 MMHG | SYSTOLIC BLOOD PRESSURE: 127 MMHG

## 2019-03-27 VITALS — DIASTOLIC BLOOD PRESSURE: 61 MMHG | SYSTOLIC BLOOD PRESSURE: 99 MMHG

## 2019-03-27 RX ADMIN — Medication SCH MG: at 17:10

## 2019-03-27 RX ADMIN — ACETAMINOPHEN PRN MG: 325 TABLET ORAL at 02:22

## 2019-03-27 RX ADMIN — ACETAMINOPHEN PRN MG: 325 TABLET ORAL at 10:51

## 2019-03-27 RX ADMIN — LORAZEPAM PRN MG: 0.5 TABLET ORAL at 05:33

## 2019-03-27 RX ADMIN — ACETAMINOPHEN PRN MG: 325 TABLET ORAL at 17:27

## 2019-03-27 RX ADMIN — DIVALPROEX SODIUM SCH MG: 250 TABLET, DELAYED RELEASE ORAL at 17:10

## 2019-03-27 RX ADMIN — Medication SCH MG: at 09:12

## 2019-03-27 RX ADMIN — ALUMINUM HYDROXIDE, MAGNESIUM HYDROXIDE, AND SIMETHICONE PRN ML: 200; 200; 20 SUSPENSION ORAL at 05:09

## 2019-03-27 RX ADMIN — DIVALPROEX SODIUM SCH MG: 250 TABLET, DELAYED RELEASE ORAL at 09:12

## 2019-03-27 NOTE — NUR
GPS: Remain uncooperative with care. pacing back and forth in the hallway.slept 6 hrs 
through the night. patient is calm at this time. ativan effective for anxiety. patient is 
very needy,verbally abusive. continue monitoring for safety.

## 2019-03-27 NOTE — NUR
Process Group:  Patients were asked to reflect and respond to the question "If you could 
change one things about yourself, what would it be and why?"



Subjective: "Takes a strong and physical person to do that"



Objective: Patient appeared irritable with labile affect. Patient was initially engaging 
with peers in a functional manner and pointed out patient strengths. Patient observed 
speaking over peers and responding with disrespectful comments.



Assessment: Patient needed frequent redirection to not speak over peers and to speak about 
her feelings rather than assume peer's feelings. Patient became hostile and aggressive 
toward redirection and was asked to leave the group. 



Plan: Encourage group attendance as scheduled.  will help patient be self aware 
when speaking and encourage exploration anger management.

## 2019-03-28 VITALS — SYSTOLIC BLOOD PRESSURE: 109 MMHG | DIASTOLIC BLOOD PRESSURE: 40 MMHG

## 2019-03-28 VITALS — SYSTOLIC BLOOD PRESSURE: 111 MMHG | DIASTOLIC BLOOD PRESSURE: 48 MMHG

## 2019-03-28 VITALS — SYSTOLIC BLOOD PRESSURE: 144 MMHG | DIASTOLIC BLOOD PRESSURE: 46 MMHG

## 2019-03-28 RX ADMIN — ACETAMINOPHEN PRN MG: 325 TABLET ORAL at 02:07

## 2019-03-28 RX ADMIN — DIVALPROEX SODIUM SCH MG: 250 TABLET, DELAYED RELEASE ORAL at 17:00

## 2019-03-28 RX ADMIN — ACETAMINOPHEN PRN MG: 325 TABLET ORAL at 08:31

## 2019-03-28 RX ADMIN — ACETAMINOPHEN PRN MG: 325 TABLET ORAL at 14:40

## 2019-03-28 RX ADMIN — LORAZEPAM PRN MG: 0.5 TABLET ORAL at 02:07

## 2019-03-28 RX ADMIN — Medication SCH MG: at 08:20

## 2019-03-28 RX ADMIN — ACETAMINOPHEN PRN MG: 325 TABLET ORAL at 21:49

## 2019-03-28 RX ADMIN — Medication SCH MG: at 17:40

## 2019-03-28 RX ADMIN — LORAZEPAM PRN MG: 0.5 TABLET ORAL at 14:41

## 2019-03-28 RX ADMIN — DIVALPROEX SODIUM SCH MG: 250 TABLET, DELAYED RELEASE ORAL at 08:21

## 2019-03-28 NOTE — NUR
Discharge Planning:



SW placed call to patient's Homeless Outreach  through [facility], Asmita Rosario 
([phone]) to discuss discharge planning and to gather further information about the 
patient. Spoke with Asmita who confirmed that the patient has a long history of 
homelessness and that she has been "looking for her" for about 2 weeks. Per Asmita, the 
patient has been attempting to get surgery for quite some time to repair a hernia with Dr. Jose Alberto Lawrence [ph. [address] [address]; [phone]].  Per 
Asmita, Dr. Lawrence was not going to perform the surgery unless the patient was medically 
and psychiatrically cleared.  Asmita further stated that the plan for the patient is go 
get her into Recuperative Care after the surgery and work on Permanent Housing for her.  
Asmita the stated that she was going to call Dr. Lawrence to see if they have time to 
schedule the patient for surgery next week.  JUAN C will continue to follow-up.

## 2019-03-28 NOTE — NUR
Patient awake, confused in the hallway. Provide reality orientation. Difficult to redirect. 
Offered PRN medications Ativan 0.5mg for anxiety and tylenol 650mg for pain (back). Will 
continue to monitor.

## 2019-03-28 NOTE — NUR
Gps/Lvn- Still had episodes of yelling spells, uses foul language from time to time, 
discouraged from doing so. Had been redirectable. Adequate relief from headache , after 
taking tylenol 650 mg. po. Requesting to be put on regular food, claimed she can chew even 
w/o teeth, extra portion of food requested.

## 2019-03-29 VITALS — DIASTOLIC BLOOD PRESSURE: 69 MMHG | SYSTOLIC BLOOD PRESSURE: 129 MMHG

## 2019-03-29 VITALS — DIASTOLIC BLOOD PRESSURE: 51 MMHG | SYSTOLIC BLOOD PRESSURE: 127 MMHG

## 2019-03-29 LAB
ALP SERPL-CCNC: 95 U/L (ref 50–136)
ALT SERPL W/O P-5'-P-CCNC: 20 U/L (ref 14–59)
AST SERPL-CCNC: 14 U/L (ref 15–37)
BASOPHILS # BLD AUTO: 0 K/UL (ref 0–8)
BASOPHILS NFR BLD AUTO: 0.7 % (ref 0–2)
BASOPHILS NFR BLD MANUAL: 2 % (ref 0–2)
BILIRUB SERPL-MCNC: 0.2 MG/DL (ref 0.2–1)
BUN SERPL-MCNC: 15 MG/DL (ref 7–18)
CHLORIDE SERPL-SCNC: 102 MMOL/L (ref 98–107)
CO2 SERPL-SCNC: 29 MMOL/L (ref 21–32)
CREAT SERPL-MCNC: 0.6 MG/DL (ref 0.6–1.3)
EOSINOPHIL # BLD AUTO: 0.1 K/UL (ref 0–0.7)
EOSINOPHIL NFR BLD AUTO: 1.8 % (ref 0–7)
EOSINOPHIL NFR BLD MANUAL: 2 % (ref 0–8)
GLUCOSE SERPL-MCNC: 103 MG/DL (ref 74–106)
HCT VFR BLD AUTO: 31.2 % (ref 31.2–41.9)
HGB BLD-MCNC: 9 G/DL (ref 10.9–14.3)
LYMPHOCYTES # BLD AUTO: 2 K/UL (ref 20–40)
LYMPHOCYTES NFR BLD AUTO: 34.1 % (ref 20.5–51.5)
LYMPHOCYTES NFR BLD MANUAL: 33 % (ref 20–40)
MCH RBC QN AUTO: 19 UUG (ref 24.7–32.8)
MCHC RBC AUTO-ENTMCNC: 29 G/DL (ref 32.3–35.6)
MCV RBC AUTO: 65.6 FL (ref 75.5–95.3)
METAMYELOCYTES NFR BLD MANUAL: 1 % (ref 0–1)
MONOCYTES # BLD AUTO: 0.8 K/UL (ref 2–10)
MONOCYTES NFR BLD AUTO: 13.8 % (ref 0–11)
MONOCYTES NFR BLD MANUAL: 14 % (ref 2–10)
NEUTROPHILS # BLD AUTO: 2.8 K/UL (ref 1.8–8.9)
NEUTROPHILS NFR BLD AUTO: 49.6 % (ref 38.5–71.5)
NEUTS SEG NFR BLD MANUAL: 48 % (ref 42–75)
PLATELET # BLD AUTO: 283 K/UL (ref 179–408)
POTASSIUM SERPL-SCNC: 4.7 MMOL/L (ref 3.5–5.1)
RBC # BLD AUTO: 4.76 MIL/UL (ref 3.63–4.92)
VALPROATE SERPL-MCNC: 5 UG/ML (ref 50–100)
WBC # BLD AUTO: 5.7 K/UL (ref 3.8–11.8)
WS STN SPEC: 7.3 G/DL (ref 6.4–8.2)

## 2019-03-29 RX ADMIN — LOPERAMIDE HYDROCHLORIDE PRN MG: 2 CAPSULE ORAL at 14:49

## 2019-03-29 RX ADMIN — TEMAZEPAM PRN MG: 15 CAPSULE ORAL at 00:04

## 2019-03-29 RX ADMIN — ACETAMINOPHEN PRN MG: 325 TABLET ORAL at 14:49

## 2019-03-29 RX ADMIN — Medication SCH MG: at 16:41

## 2019-03-29 RX ADMIN — DIVALPROEX SODIUM SCH MG: 250 TABLET, DELAYED RELEASE ORAL at 08:21

## 2019-03-29 RX ADMIN — DIVALPROEX SODIUM SCH MG: 250 TABLET, DELAYED RELEASE ORAL at 16:42

## 2019-03-29 RX ADMIN — LORAZEPAM PRN MG: 0.5 TABLET ORAL at 16:41

## 2019-03-29 RX ADMIN — Medication SCH MG: at 08:10

## 2019-03-29 RX ADMIN — ALUMINUM HYDROXIDE, MAGNESIUM HYDROXIDE, AND SIMETHICONE PRN ML: 200; 200; 20 SUSPENSION ORAL at 04:24

## 2019-03-29 RX ADMIN — LORAZEPAM PRN MG: 0.5 TABLET ORAL at 08:20

## 2019-03-29 RX ADMIN — LOPERAMIDE HYDROCHLORIDE PRN MG: 2 CAPSULE ORAL at 08:20

## 2019-03-29 RX ADMIN — LOPERAMIDE HYDROCHLORIDE PRN MG: 2 CAPSULE ORAL at 20:47

## 2019-03-29 NOTE — NUR
Gps/Lvn- Caught and observed walking around just w/ disposable underwearand a blouse, 
discourage patient from doing so, redirected to pun her pants on, claimed she goes freq. to 
the bathroom and making a mess on her clothes . Offered to wear liner on her underwear , pt. 
refused. Offered to use bathroom on the hallway, prn offered.

## 2019-03-30 VITALS — SYSTOLIC BLOOD PRESSURE: 116 MMHG | DIASTOLIC BLOOD PRESSURE: 52 MMHG

## 2019-03-30 VITALS — SYSTOLIC BLOOD PRESSURE: 130 MMHG | DIASTOLIC BLOOD PRESSURE: 61 MMHG

## 2019-03-30 VITALS — DIASTOLIC BLOOD PRESSURE: 62 MMHG | SYSTOLIC BLOOD PRESSURE: 136 MMHG

## 2019-03-30 LAB
APPEARANCE UR: CLEAR
BILIRUB UR QL STRIP: NEGATIVE
CAOX CRY URNS QL MICRO: (no result) /HPF
COLOR UR: YELLOW
DEPRECATED SQUAMOUS URNS QL MICRO: (no result) /HPF
GLUCOSE UR STRIP-MCNC: NEGATIVE MG/DL
HGB UR QL STRIP: NEGATIVE
KETONES UR STRIP-MCNC: (no result) MG/DL
LEUKOCYTE ESTERASE UR QL STRIP: NEGATIVE
MUCOUS THREADS URNS QL MICRO: (no result) /LPF
NITRITE UR QL STRIP: NEGATIVE
PH UR STRIP: 5.5 [PH] (ref 5–8)
RBC #/AREA URNS HPF: (no result) /HPF (ref 0–3)
SP GR UR STRIP: 1.02 (ref 1–1.03)
UROBILINOGEN UR STRIP-MCNC: 0.2 E.U./DL
WBC #/AREA URNS HPF: (no result) /HPF
WBC #/AREA URNS HPF: (no result) /HPF (ref 0–3)

## 2019-03-30 RX ADMIN — TEMAZEPAM PRN MG: 15 CAPSULE ORAL at 23:23

## 2019-03-30 RX ADMIN — DIVALPROEX SODIUM SCH MG: 250 TABLET, DELAYED RELEASE ORAL at 08:09

## 2019-03-30 RX ADMIN — ACETAMINOPHEN PRN MG: 325 TABLET ORAL at 17:18

## 2019-03-30 RX ADMIN — LORAZEPAM PRN MG: 0.5 TABLET ORAL at 08:07

## 2019-03-30 RX ADMIN — ACETAMINOPHEN PRN MG: 325 TABLET ORAL at 02:32

## 2019-03-30 RX ADMIN — ACETAMINOPHEN PRN MG: 325 TABLET ORAL at 11:00

## 2019-03-30 RX ADMIN — CLONAZEPAM PRN MG: 0.5 TABLET ORAL at 14:53

## 2019-03-30 RX ADMIN — TEMAZEPAM PRN MG: 15 CAPSULE ORAL at 00:21

## 2019-03-30 RX ADMIN — ACETAMINOPHEN PRN MG: 325 TABLET ORAL at 23:24

## 2019-03-30 RX ADMIN — LOPERAMIDE HYDROCHLORIDE PRN MG: 2 CAPSULE ORAL at 23:23

## 2019-03-30 RX ADMIN — Medication SCH MG: at 17:16

## 2019-03-30 RX ADMIN — LORAZEPAM PRN MG: 0.5 TABLET ORAL at 02:32

## 2019-03-30 RX ADMIN — DIVALPROEX SODIUM SCH MG: 250 TABLET, DELAYED RELEASE ORAL at 17:00

## 2019-03-30 RX ADMIN — CLONAZEPAM PRN MG: 0.5 TABLET ORAL at 20:21

## 2019-03-30 RX ADMIN — Medication SCH MG: at 08:07

## 2019-03-30 NOTE — NUR
Gps/Lvn- Patient continue to show aggression toward her female peer, continue to monitor 
behavior, able to follow simple directions with lots of prompting , instructed to stay in 
her room for now, monitoring patient closely .Needy  behavior, easily gets irritated, 
argumentative.
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
## 2019-03-30 NOTE — NUR
Pt restless and anxious, Klonopin 0.5mg administered as ordered with moderate effect. Will 
continue to monitor.

## 2019-03-31 VITALS — SYSTOLIC BLOOD PRESSURE: 136 MMHG | DIASTOLIC BLOOD PRESSURE: 49 MMHG

## 2019-03-31 VITALS — DIASTOLIC BLOOD PRESSURE: 54 MMHG | SYSTOLIC BLOOD PRESSURE: 130 MMHG

## 2019-03-31 VITALS — DIASTOLIC BLOOD PRESSURE: 66 MMHG | SYSTOLIC BLOOD PRESSURE: 140 MMHG

## 2019-03-31 RX ADMIN — ACETAMINOPHEN PRN MG: 325 TABLET ORAL at 06:14

## 2019-03-31 RX ADMIN — LOPERAMIDE HYDROCHLORIDE PRN MG: 2 CAPSULE ORAL at 15:38

## 2019-03-31 RX ADMIN — CLONAZEPAM PRN MG: 0.5 TABLET ORAL at 15:35

## 2019-03-31 RX ADMIN — DIVALPROEX SODIUM SCH MG: 250 TABLET, DELAYED RELEASE ORAL at 08:02

## 2019-03-31 RX ADMIN — DIVALPROEX SODIUM SCH MG: 250 TABLET, DELAYED RELEASE ORAL at 17:00

## 2019-03-31 RX ADMIN — LORAZEPAM PRN MG: 0.5 TABLET ORAL at 18:57

## 2019-03-31 RX ADMIN — ACETAMINOPHEN PRN MG: 325 TABLET ORAL at 13:58

## 2019-03-31 RX ADMIN — Medication SCH MG: at 17:44

## 2019-03-31 RX ADMIN — LORAZEPAM PRN MG: 0.5 TABLET ORAL at 06:14

## 2019-03-31 RX ADMIN — Medication SCH MG: at 08:02

## 2019-03-31 NOTE — NUR
Pt had 2 episodes of diarrhea and incontinence. Shower given, Imodium 2mg administered as 
ordered. Will continue to monitor.

## 2019-04-01 VITALS — SYSTOLIC BLOOD PRESSURE: 132 MMHG | DIASTOLIC BLOOD PRESSURE: 57 MMHG

## 2019-04-01 VITALS — DIASTOLIC BLOOD PRESSURE: 54 MMHG | SYSTOLIC BLOOD PRESSURE: 127 MMHG

## 2019-04-01 VITALS — DIASTOLIC BLOOD PRESSURE: 83 MMHG | SYSTOLIC BLOOD PRESSURE: 133 MMHG

## 2019-04-01 RX ADMIN — ACETAMINOPHEN PRN MG: 325 TABLET ORAL at 15:46

## 2019-04-01 RX ADMIN — TEMAZEPAM PRN MG: 15 CAPSULE ORAL at 00:27

## 2019-04-01 RX ADMIN — Medication SCH MG: at 08:07

## 2019-04-01 RX ADMIN — ACETAMINOPHEN PRN MG: 325 TABLET ORAL at 07:55

## 2019-04-01 RX ADMIN — Medication SCH MG: at 16:50

## 2019-04-01 RX ADMIN — ACETAMINOPHEN PRN MG: 325 TABLET ORAL at 23:54

## 2019-04-01 RX ADMIN — CLONAZEPAM PRN MG: 0.5 TABLET ORAL at 15:57

## 2019-04-01 RX ADMIN — TEMAZEPAM PRN MG: 15 CAPSULE ORAL at 23:54

## 2019-04-01 RX ADMIN — DIVALPROEX SODIUM SCH MG: 250 TABLET, DELAYED RELEASE ORAL at 16:49

## 2019-04-01 RX ADMIN — ACETAMINOPHEN PRN MG: 325 TABLET ORAL at 00:27

## 2019-04-01 RX ADMIN — LOPERAMIDE HYDROCHLORIDE PRN MG: 2 CAPSULE ORAL at 23:54

## 2019-04-01 RX ADMIN — LORAZEPAM PRN MG: 0.5 TABLET ORAL at 07:57

## 2019-04-01 RX ADMIN — DIVALPROEX SODIUM SCH MG: 250 TABLET, DELAYED RELEASE ORAL at 08:07

## 2019-04-01 RX ADMIN — LOPERAMIDE HYDROCHLORIDE PRN MG: 2 CAPSULE ORAL at 16:41

## 2019-04-01 NOTE — NUR
Discharge planning:  faxed referral packet for SNF placement to [facility] 
[([phone]; Contact: [facility]] and is currently awaiting reply with admission status.

-------------------------------------------------------------------------------

Addendum: 04/01/19 at 1536 by ISABEL DIAZ SW

-------------------------------------------------------------------------------

 received confirmation from [facility] , that patient has been 
accepted to facility.

## 2019-04-02 VITALS — SYSTOLIC BLOOD PRESSURE: 115 MMHG | DIASTOLIC BLOOD PRESSURE: 90 MMHG

## 2019-04-02 VITALS — DIASTOLIC BLOOD PRESSURE: 66 MMHG | SYSTOLIC BLOOD PRESSURE: 132 MMHG

## 2019-04-02 VITALS — SYSTOLIC BLOOD PRESSURE: 138 MMHG | DIASTOLIC BLOOD PRESSURE: 59 MMHG

## 2019-04-02 RX ADMIN — LOPERAMIDE HYDROCHLORIDE PRN MG: 2 CAPSULE ORAL at 22:04

## 2019-04-02 RX ADMIN — DIVALPROEX SODIUM SCH MG: 250 TABLET, DELAYED RELEASE ORAL at 08:01

## 2019-04-02 RX ADMIN — LOPERAMIDE HYDROCHLORIDE PRN MG: 2 CAPSULE ORAL at 12:20

## 2019-04-02 RX ADMIN — Medication SCH MG: at 16:52

## 2019-04-02 RX ADMIN — LORAZEPAM PRN MG: 0.5 TABLET ORAL at 08:01

## 2019-04-02 RX ADMIN — LORAZEPAM PRN MG: 0.5 TABLET ORAL at 14:35

## 2019-04-02 RX ADMIN — DIVALPROEX SODIUM SCH MG: 250 TABLET, DELAYED RELEASE ORAL at 16:52

## 2019-04-02 RX ADMIN — Medication SCH MG: at 08:01

## 2019-04-02 RX ADMIN — ACETAMINOPHEN PRN MG: 325 TABLET ORAL at 06:41

## 2019-04-02 NOTE — NUR
Firearms Report:  JUAN C completed and submitted [employer] Firearms Report for 5150 DTS/GD 
certification.

## 2019-04-03 VITALS — DIASTOLIC BLOOD PRESSURE: 70 MMHG | SYSTOLIC BLOOD PRESSURE: 141 MMHG

## 2019-04-03 VITALS — DIASTOLIC BLOOD PRESSURE: 68 MMHG | SYSTOLIC BLOOD PRESSURE: 139 MMHG

## 2019-04-03 VITALS — SYSTOLIC BLOOD PRESSURE: 145 MMHG | DIASTOLIC BLOOD PRESSURE: 63 MMHG

## 2019-04-03 RX ADMIN — Medication SCH MG: at 08:03

## 2019-04-03 RX ADMIN — TEMAZEPAM PRN MG: 15 CAPSULE ORAL at 01:50

## 2019-04-03 RX ADMIN — Medication SCH MG: at 16:01

## 2019-04-03 RX ADMIN — DIVALPROEX SODIUM SCH MG: 250 TABLET, DELAYED RELEASE ORAL at 08:03

## 2019-04-03 RX ADMIN — DIVALPROEX SODIUM SCH MG: 250 TABLET, DELAYED RELEASE ORAL at 16:04

## 2019-04-03 RX ADMIN — DIVALPROEX SODIUM SCH MG: 250 TABLET, DELAYED RELEASE ORAL at 16:01

## 2019-04-03 NOTE — NUR
Patient sleeping, arouses easily for medication administration but gets agitated and uses 
foul language. Aware of DC in am. NAD noted.

## 2019-04-03 NOTE — NUR
DISCHARGE NOTE FOR 4/4/19:



Patient has refused SNF placement, even though it was offered to her. Patient is choosing to 
be discharged [facility] [[address] [place] [address]; 
[phone]] via public transportation at 5:00am.  Patient was given $3 to ride the bus. 
Patient reports she knows how to use the public transportation. Patient is able to plan for 
self-care.  She is alert and oriented x4, reports she has been in and out of homelessness 
for the past 44 years, and states she has been living on the sidewalk since 2012. Patient's 
mood seems labile with a congruent affect.  At 1530 on 4/2/19, this writer called [facility] ([phone]) and spoke with Teresita at the  who states the shelter "do 
not accept warm hand-off or create a list of patients coming to their shelter."  Per 
Teresita, "If the patient gets her at 7 in the morning, she will be seen by intake and will 
likely be offered a bed in their 30-90 day shelter."



SW left a message for patient's two Department of Mental Health Social Workers: Asmita Rosario ([phone]) and Bimal Cui ([phone]) to alert about patient's discharge.  
Patient reports she is [facility] the [facility],  her SSI check, and then call her 
SW, Asmita, to let her know where she is. 



Patient was provided with the homeless shelter packet, which includes a list of emergency 
shelters, housing resources, drop in centers, and showers/hot meals centers. This also 
included the Homeless Information Hotline (897)-470-0879 or 211, Aductions for [employer] (821)- 819-1081, and the [facility] [phone]; [facility] ([phone]).  Patient 
was referred to [facility] [phone] for Internist referrals and was provided 
referrals to low-cost clinics. Patient was provided with outpatient mental health resources 
to [facility] [phone], [facility] [phone], and the [place] 
[facility] [phone]. She has completed and signed the homeless 
patient waiver form.

-------------------------------------------------------------------------------

Addendum: 04/03/19 at 1620 by NATE DOWNS, ISABEL SW

-------------------------------------------------------------------------------

For smoking cessation, patient was referred to American [employer] 800-LUNGUSA and 
[employer] [phone].

## 2019-04-03 NOTE — NUR
Discharge Planning:



At 1400:  SW met with patient to discuss discharge planning.  SW educated and offered 
patient placement at [facility] [[address] [place] [address]; Phone: 
[phone]].  Patient then stated, "[facility]  
Patient then stated that she has been in and out of homelessness for the past 44 years.  
Patient was able to verbalize a plan for self-care, reports [facility], and stated that she gets her Social Security checks delivered 
to the [facility] [[address] [place] [address]; [phone]].  
Patient refused SNF placement, despite education about safe discharging by this SW.  Patient 
became verbally abusive when SW attempt to discuss placement again at a later time. Patient 
reports that she plans to go to the [facility],  her Social Security check, and 
then call her [facility] , Asmita Rosario to let her know where she is.  Patient 
reported she knows the public transportation system and will use the bus to get to the 
[facility]. This SW consulted with , Jasmin BOX about this case.



At 1530:  SW placed call to [facility] ([phone]) to provide a warm hand-off.  SW 
spoke with Teresita at the  who stated that they "do not accept warm hand-off or 
create a list of patients coming to their shelter."  Per Teresita, "If the patient gets her 
at 7 in the morning, she will be seen by intake and will likely be offered a bed in their 
30-90 day shelter."   



At 1540:  SW placed call to patient's [facility] , Jack Cui ([phone]), who is 
covering for her regular , Asmita Rosario ([phone]), and this SW alerted 
Jack about patient's discharge plan.

## 2019-04-04 RX ADMIN — CLONAZEPAM PRN MG: 0.5 TABLET ORAL at 00:32

## 2019-04-04 RX ADMIN — ACETAMINOPHEN PRN MG: 325 TABLET ORAL at 00:32

## 2019-04-04 RX ADMIN — LOPERAMIDE HYDROCHLORIDE PRN MG: 2 CAPSULE ORAL at 00:32

## 2019-04-04 NOTE — NUR
DC to shelter with belongings and basic necessities. Complete discharge instructions given 
by RNs Don.

## 2019-04-04 NOTE — NUR
Patient awaiting discharge to [facility]. Discharge process initiated and will 
be completed by charge LISA Mckoy.

## 2019-04-04 NOTE — NUR
Requesting to take a shower. Monitored closely while in the shower. Patient calm and 
cooperative this time.

## 2019-04-04 NOTE — NUR
Awake, pacing the hallway again, agitated and cursing. Refused Ativan. Redirected and 
advised appropriately.

## 2019-04-04 NOTE — NUR
LATE NOTE: DISCHARGE NOTE NSG/GPS

Patient discharged as indicated by  and Endorsed to me by Day shift Charge 
Nurse. Patient to arrive to [facility] via public transportation by 7:00 a.m. Patient 
was provided with discharge and follow-up instructions, as well three dollars left by Social 
Services to be given to patient for transportation. Patient refused all other placement 
options and verbalized preference for the shelter. Patient oriented times four at time of 
discharge, able to care for self. Denied suicidal ideation or intent. Denied homicidal 
ideation or intent. Patient verbalized understanding of discharge instructions at time of 
departure.

## 2019-04-12 ENCOUNTER — HOSPITAL ENCOUNTER (INPATIENT)
Dept: HOSPITAL 36 - GERO | Age: 69
LOS: 12 days | Discharge: NURSING, REHAB OR LONG-TERM CARE | DRG: 885 | End: 2019-04-24
Attending: PSYCHIATRY & NEUROLOGY | Admitting: PSYCHIATRY & NEUROLOGY
Payer: MEDICARE

## 2019-04-12 VITALS — DIASTOLIC BLOOD PRESSURE: 62 MMHG | SYSTOLIC BLOOD PRESSURE: 130 MMHG

## 2019-04-12 DIAGNOSIS — G47.00: ICD-10-CM

## 2019-04-12 DIAGNOSIS — Z59.0: ICD-10-CM

## 2019-04-12 DIAGNOSIS — F31.2: Primary | ICD-10-CM

## 2019-04-12 DIAGNOSIS — Z88.0: ICD-10-CM

## 2019-04-12 DIAGNOSIS — F17.210: ICD-10-CM

## 2019-04-12 PROCEDURE — Z7610: HCPCS

## 2019-04-12 PROCEDURE — G0410 GRP PSYCH PARTIAL HOSP 45-50: HCPCS

## 2019-04-12 SDOH — ECONOMIC STABILITY - HOUSING INSECURITY: HOMELESSNESS: Z59.0

## 2019-04-13 NOTE — PSYCHIATRIC EVALUATION
DATE OF SERVICE:  04/12/2019



PATIENT'S AGE:  68.



SEX:  Female.



PHYSICIAN:  Dr. Corbin Hdz.



CHIEF COMPLAINT:  5150 hold for dangerous to herself and grave disability.



HISTORY OF PRESENT ILLNESS:  The patient was placed on 5150 hold by the police

for dangers to self and grave disability after the patient was seen running in

and out of the roads exposing herself to dangerous situation.  The patient told

 that there is a pot hole in the street and that she was walking in the

street to fix it.  The patient also was jumping in and out of traffic and was

unable to care for self, according to the hold.



Chart reviewed and the patient interviewed.  The patient has a history of

schizophrenia.  The patient has been paranoid and suspicious and it seemed that

the patient has not been taking her psych medications.  She also seems to be

agitated and has been in angry and irritable mood.  Also, she has not been able

to care for herself.  The patient is homeless and she was not able to give me

any information about their living situation.



PAST PSYCHIATRIC HISTORY:  According to the chart, the patient has history of

schizophrenia, but it seems that the patient has not been taking care of herself

or her needs.



PAST MEDICAL HISTORY:  The patient denied.



SOCIAL HISTORY:  The patient is homeless.  The patient is unable to give me any

more information at this time about any other information.  She has no known

alcohol or drug use.



ALLERGIES:  No known allergies.



MENTAL STATUS EXAMINATION:  The patient appears older than her stated age. 

Disheveled.  Flat affect.  Irritable mood.  Disorganized thoughts.  Thought

processes are with poverty of speech.  The patient denied any auditory or visual

hallucinations, but actively responding to stimuli.  The patient denied any

thoughts of suicide or homicide.  The patient is alert and oriented to

situation, but not to the place or person or date.  Unable to assess her memory

at this time because of her confusion and her irritability.  Poor insight.  Poor

judgment.



ASSESSMENT:

PRIMARY DIAGNOSIS:  Chronic paranoid schizophrenia with acute exacerbation.



TREATMENT PLAN:  We will monitor the patient's behavior and condition closely. 

We will start individual as well as milieu psychotherapy.  We will start the

patient on Seroquel and we will adjust the dose.



ESTIMATED LENGTH OF STAY:  5-7 days.



THE PATIENT'S STRENGTHS AND WEAKNESSES:  The patient seems to be in relatively

fair health.  Weaknesses is poor judgment and ineffective and noncompliance with

treatment medications.



AFTER DISCHARGE PLAN:  Outpatient treatment and followup will continue as an

outpatient and the patient will need placement.



CRITERIA FOR DISCHARGE:  The patient will not be psychotic and will stabilize on

psychotropic medications and will establish outpatient treatment plans.





DD: 04/12/2019 18:27

DT: 04/13/2019 04:39

[facility]# [id number]  [id number]

## 2019-04-13 NOTE — HISTORY & PHYSICAL
ADMIT DATE:     04/12/2019



REASON FOR ADMISSION:  Psychiatric disorder.



HISTORY OF PRESENT ILLNESS:  This is a 68-year-old female admitted to [facility]

unit for underlying psychiatric illness by Dr. Hdz.  Dr. Hdz requested

medical H and P on this patient.



PAST MEDICAL HISTORY:  Denies any.



PAST SURGICAL HISTORY:  Negative.



PAST FAMILY HISTORY:  Negative.



SOCIAL HISTORY:  Lives at home.  Denies any alcohol or tobacco use.



MEDICATION:  MAR.



ALLERGIES:  PENICILLIN.



REVIEW OF SYSTEMS:  No fever, no chills, no headache, no nausea, no abdominal

pain, no chest pain or trouble breathing.



PHYSICAL EXAMINATION:

VITAL SIGNS:  Temperature 97.6, pulse 76, respirations 20, blood [id number]/84

HEENT:  Unremarkable.

HEART:  S1, S2 normal.

LUNGS:  Clear.

ABDOMEN:  Soft.



ASSESSMENT:

1.  Mental status.

2.  Insomnia.



PLAN:  The patient admitted to [facility] for evaluation by the psychiatrist. 

The patient is medically stable.  Continue psychotropic medications.





DD: 04/12/2019 21:51

DT: 04/12/2019 22:19

[facility]# [id number]  [id number]

[facility]

## 2019-04-14 NOTE — PROGRESS NOTES
DATE:  04/13/2019



The patient was evaluated and examined.



Covering for Dr. Hdz.



IDENTIFYING DATA:  A 68-year-old female brought in here on a 5150 by the police

after the patient was seen running in and out of the road, exposing herself to

danger situation.



Overnight, the patient was observed to be irritable, agitated, argumentative,

severe mood swings and demanding.  She has been intermittently refusing Seroquel

and unpredictable.



Today on face-to-face evaluation, the patient easily tearful upon approach,

perseverating with racing thoughts and labile.  Needing a lot of redirection to

maintain a simple conversation.



MENTAL STATUS EXAMINATION:  Irritable, agitated, hyperverbal, hypersexual.



ASSESSMENT AND PLAN:  Due to the patient's ongoing labile behavior secondary to

the patient's manic state and intermittently refusing medication, may consider

[place] the patient on Monday when [place] [employer] courts are

open.  In the meantime, we will continue validating the patient's emotions and

redirecting patient until she starts taking medications _____





DD: 04/13/2019 14:53

DT: 04/14/2019 03:18

JOB# [id number]  [id number]

## 2019-04-15 NOTE — GENERAL PROGRESS NOTE
Subjective





- Review of Systems


Service Date: 04/15/19


Subjective: 





Patient doing fine denied any complaints





Objective





- Physical Exam


Vitals and I&O: 


 Vital Signs











Temp  98.0 F   04/15/19 20:43


 


Pulse  101   04/15/19 20:43


 


Resp  20   04/15/19 20:43


 


BP  121/58   04/15/19 20:43


 


Pulse Ox  97   04/15/19 20:43








 Intake & Output











 04/15/19 04/15/19 04/16/19





 06:59 18:59 06:59


 


Intake Total 480 1200 280


 


Balance 480 1200 280


 


Intake:   


 


  Oral 480 1200 280


 


Other:   


 


  # Voids 2 3 2


 


  # Bowel Movements 0 1 1


 


  Stool Characteristics Soft Soft 





 Brown Brown 











Active Medications: 


Current Medications





Acetaminophen (Tylenol)  650 mg PO Q4HR PRN


   PRN Reason: Mild Pain / Temp above 100


   Stop: 06/11/19 04:51


   Last Admin: 04/15/19 17:05 Dose:  650 mg


Al Hydrox/Mg Hydrox/Simethicone (Maalox)  30 ml PO Q4HR PRN


   PRN Reason: GI DISTRESS


   Stop: 06/11/19 04:51


Lorazepam (Ativan)  0.5 mg PO Q4HR PRN; Protocol


   PRN Reason: Agitation


   Stop: 05/12/19 04:51


   Last Admin: 04/15/19 17:05 Dose:  0.5 mg


Magnesium Hydroxide (Milk Of Magnesia)  30 ml PO HS PRN


   PRN Reason: Constipation


Multivitamins/Vitamin C (Theragran)  1 tab PO DAILY MERRILL


   Stop: 06/11/19 08:59


   Last Admin: 04/15/19 09:01 Dose:  1 tab


Risperidone (Risperdal)  2 mg PO HS MERRILL; Protocol


   Stop: 06/14/19 20:59


   Last Admin: 04/15/19 20:48 Dose:  2 mg


Risperidone (Risperdal)  1 mg PO DAILY MERRILL; Protocol


   Stop: 06/14/19 08:59


   Last Admin: 04/15/19 09:00 Dose:  1 mg


Zolpidem Tartrate (Ambien)  5 mg PO HS PRN


   PRN Reason: Insomnia


   Stop: 06/11/19 04:51


   Last Admin: 04/14/19 22:00 Dose:  5 mg








Cardiovascular: Regular rate


Lungs: Clear to auscultation





Assessment/Plan





- Assessment


Assessment: 





Nicotine dependancy


Psych disorder





- Plan


Plan: 





SMoking cessation advised


Psych follow up

## 2019-04-15 NOTE — PROGRESS NOTES
DATE:  04/14/2019



SUBJECTIVE:  The patient was seen and evaluated.  The patient's chart reviewed. 

Covering for Dr. Hdz.



Overnight nursing staff reported that the patient continued with poor sleep,

easily agitated.  Today on face-to-face evaluation, the patient is yelling,

screaming incoherently and also starts posturing and getting angry and we

ordered emergent medication because of the aggressive behavior.



MENTAL STATUS EXAMINATION:  Psychotic, agitated, resulting in posturing and

aggressive behavior towards others.



ASSESSMENT AND PLAN:  The patient with ongoing paranoia and suspicious behavior

resulting in aggressive behavior, unable to formulate a safe plan.  We will

continue increasing the Seroquel to 50 mg p.o. t.i.d.  We have given the patient

emergent medications at this point to help alleviate the patient's aggressive

state and constant paranoia.





DD: 04/14/2019 07:12

DT: 04/15/2019 03:31

JOB# [id number]  [id number]

## 2019-04-16 RX ADMIN — ALUMINUM HYDROXIDE, MAGNESIUM HYDROXIDE, AND SIMETHICONE PRN ML: 200; 200; 20 SUSPENSION ORAL at 10:15

## 2019-04-16 RX ADMIN — ALUMINUM HYDROXIDE, MAGNESIUM HYDROXIDE, AND SIMETHICONE PRN ML: 200; 200; 20 SUSPENSION ORAL at 21:34

## 2019-04-16 NOTE — PROGRESS NOTES
DATE:  04/16/2019



SUBJECTIVE:  Chart reviewed and the patient interviewed.  Also discussed the

patient's condition with the staff and reviewed records and labs.  The patient

continued to exhibit manic behavior.  The patient is still restless and is still

in irritable mood.  She also continued to resisting care.  She also is still

hyperverbal and she is still in angry mood and had grandiose delusions ongoing

around talked about her [employer] and also interrupting others.  On the other

hand, the patient is compliant with taking her medications with no side effects

of medications.



ASSESSMENT:  The patient is still exhibiting manic behavior.



TREATMENT PLAN:  Continue to monitor her behavior and condition closely.  Also,

we will increase Risperdal to 1 mg twice a day and 2 mg at bedtime.  Also,

continue to work on her behavior and her manic symptoms.  Also, we will add

trazodone in a dose of 50 mg at bedtime to help the patient to sleep better at

night and hopefully to help with her manic symptoms.





DD: 04/16/2019 07:46

DT: 04/16/2019 19:10

[facility]# [id number]  [id number]

## 2019-04-16 NOTE — PROGRESS NOTES
DATE:  04/15/2019



SUBJECTIVE:  Chart reviewed and the patient interviewed.  I also discussed the

patient's condition with the staff and reviewed records and labs.  The patient

is easily agitated.  The patient also is still intrusive to others and she is

wandering around the unit, talking to herself and talking to other patients and

disturbing them.  She also still has difficulty with following directions.  She

also is still hyperverbal and she is still at times refusing Seroquel, but

agreeing to take Risperdal.  The patient also aggressive with the staff and she

is posturing at times to staff.



ASSESSMENT:  The patient is still aggressive and labile.



TREATMENT PLAN:  We will continue to monitor her behavior and her condition

closely.  Also, we will continue working on behavioral modification.  Also, we

will discontinue Seroquel and we will start the Risperdal 1 mg in the morning

and 2 mg at bedtime and continue to follow up.





DD: 04/15/2019 20:34

DT: 04/16/2019 11:02

[facility]# [id number]  [id number]

## 2019-04-17 RX ADMIN — ALUMINUM HYDROXIDE, MAGNESIUM HYDROXIDE, AND SIMETHICONE PRN ML: 200; 200; 20 SUSPENSION ORAL at 10:16

## 2019-04-17 RX ADMIN — HALOPERIDOL SCH MG: 2 SOLUTION ORAL at 08:17

## 2019-04-17 RX ADMIN — HALOPERIDOL SCH MG: 2 SOLUTION ORAL at 13:49

## 2019-04-17 RX ADMIN — HALOPERIDOL SCH MG: 2 SOLUTION ORAL at 20:39

## 2019-04-18 RX ADMIN — HALOPERIDOL SCH MG: 2 SOLUTION ORAL at 16:21

## 2019-04-18 RX ADMIN — HALOPERIDOL SCH MG: 2 SOLUTION ORAL at 08:15

## 2019-04-18 RX ADMIN — HALOPERIDOL SCH MG: 2 SOLUTION ORAL at 20:43

## 2019-04-18 NOTE — PROGRESS NOTES
DATE:  



SUBJECTIVE:  Chart reviewed and the patient interviewed.  Also discussed the

patient's condition with the staff and reviewed records and labs.  The patient

continued to be in irritable and angry mood.  The patient is still easily

agitated and she is still demanding and is intrusive to others.  The patient

also is hyperverbal and she is still unable to carry on coherent conversation

and because of her rambling and being intrusive, easily agitated.  The patient

also was yelling and screaming earlier and has still had difficulty redirecting

her.  Otherwise, the patient is compliant with taking her medications and also

the patient had no side effects of medications, but she is demanding to get

Cogentin.  She continued to comply with taking Risperdal and Haldol with no side

effects.



ASSESSMENT:  The patient is still agitated and is still psychotic.



TREATMENT PLAN:  Continue to monitor her behavior and her condition and continue

to work on her anger and irritability and also continue to adjust psychotropic

medications.





DD: 04/18/2019 10:00

DT: 04/18/2019 22:56

JOB# [id number]  [id number]

## 2019-04-18 NOTE — PROGRESS NOTES
DATE:  04/17/2019



SUBJECTIVE:  Chart reviewed and the patient interviewed.  Also, discussed the

patient's condition with the staff and reviewed records and labs.  The patient

is still extremely agitated and manicky and the patient had to be given Haldol 5

mg earlier in order to calm her down.  The patient is still restless and is

still angry and in irritable mood.  The patient also is suspicious and is

paranoid.  She also still has difficulty with her mood and she still needs lots

of redirections.  She also is intrusive to others.  Otherwise, the patient is

cooperative and compliant with taking her medications with no side effects of

medications.



ASSESSMENT:  The patient is still psychotic and is still in irritable mood.



TREATMENT PLAN:  We will continue to monitor her behavior closely.  Also, we

will add Haldol in a dose of 5 mg 3 times a day and we will monitor the dose. 

Also, continue to work on her ineffective coping and her agitation and

irritability.





DD: 04/17/2019 06:34

DT: 04/18/2019 03:57

JOB# [id number]  [id number]

## 2019-04-19 RX ADMIN — HALOPERIDOL SCH MG: 2 SOLUTION ORAL at 14:11

## 2019-04-19 RX ADMIN — HALOPERIDOL SCH MG: 2 SOLUTION ORAL at 08:26

## 2019-04-19 RX ADMIN — HALOPERIDOL SCH MG: 2 SOLUTION ORAL at 20:59

## 2019-04-20 RX ADMIN — HALOPERIDOL SCH MG: 2 SOLUTION ORAL at 14:34

## 2019-04-20 RX ADMIN — ALUMINUM HYDROXIDE, MAGNESIUM HYDROXIDE, AND SIMETHICONE PRN ML: 200; 200; 20 SUSPENSION ORAL at 17:03

## 2019-04-20 RX ADMIN — HALOPERIDOL SCH MG: 2 SOLUTION ORAL at 21:19

## 2019-04-20 RX ADMIN — HALOPERIDOL SCH MG: 2 SOLUTION ORAL at 08:47

## 2019-04-20 NOTE — PROGRESS NOTES
DATE:  



SUBJECTIVE:  Chart reviewed and the patient interviewed.  Also discussed the

patient's condition with the staff and reviewed records and labs.  The patient

is still anxious and is still in an irritable mood.  The patient also is still

restless and she is still easily agitated and she is intrusive to others.  She

also still has episodes of anger with yelling at times and intrusive to other

patients.  Otherwise, the patient is compliant with taking her medications with

no side effect of medications.



The patient is complaining of a hernia pain in her right.



ASSESSMENT:  The patient is still agitated and still needs lots of redirections.



TREATMENT PLAN:  Continue to monitor her behavior and her condition closely. 

Also, continue to work on her behavior and being intrusive and continue to work

on her manic behavior.





DD: 04/19/2019 06:37

DT: 04/20/2019 04:14

JOB# [id number]  [id number]

## 2019-04-20 NOTE — GENERAL PROGRESS NOTE
Subjective





- Review of Systems


Service Date: 19


Subjective: 





Patient doing fine no new medical concern noted





Objective





- Physical Exam


Vitals and I&O: 


 Vital Signs











Temp  97.3 F   19 20:00


 


Pulse  100   19 20:00


 


Resp  20   19 20:00


 


BP  110/62   19 20:00


 


Pulse Ox  99   19 20:00








 Intake & Output











 19





 06:59 18:59 06:59


 


Intake Total 660 1000 


 


Balance 660 1000 


 


Intake:   


 


  Oral 660 1000 


 


Other:   


 


  # Voids 2 4 


 


  # Bowel Movements 0 1 











Active Medications: 


Current Medications





Acetaminophen (Tylenol)  650 mg PO Q4HR PRN


   PRN Reason: Mild Pain / Temp above 100


   Stop: 19 04:51


   Last Admin: 19 08:47 Dose:  650 mg


Al Hydrox/Mg Hydrox/Simethicone (Maalox)  30 ml PO Q4HR PRN


   PRN Reason: GI DISTRESS


   Stop: 19 04:51


   Last Admin: 19 17:03 Dose:  30 ml


Benztropine Mesylate (Cogentin)  1 mg PO BID MERRILL


   Stop: 19 08:59


   Last Admin: 19 17:07 Dose:  1 mg


Haloperidol Lactate (Haldol Concentrate 10mg/5ml Susp)  5 mg PO TID MERRILL; 

Protocol


   Stop: 19 08:59


   Last Admin: 19 21:19 Dose:  5 mg


Magnesium Hydroxide (Milk Of Magnesia)  30 ml PO HS PRN


   PRN Reason: Constipation


Multivitamins/Vitamin C (Theragran)  1 tab PO DAILY MERRILL


   Stop: 19 08:59


   Last Admin: 19 08:47 Dose:  1 tab


Risperidone (Risperdal)  2 mg PO HS MERRILL; Protocol


   Stop: 19 20:59


   Last Admin: 19 21:19 Dose:  2 mg


Risperidone (Risperdal)  1 mg PO BID MERRILL; Protocol


   Stop: 06/15/19 08:59


   Last Admin: 19 17:08 Dose:  1 mg


Trazodone HCl (Desyrel)  50 mg PO HS MERRILL; Protocol


   Stop: 06/15/19 20:59


   Last Admin: 19 21:25 Dose:  50 mg








Cardiovascular: Regular rate


Lungs: Clear to auscultation





Assessment/Plan





- Assessment


Assessment: 





Nicotine dependancy


Psych disorder





- Plan


Plan: 





Medically stable


Continue current treatment





Nutritional Asmnt/Malnutr-PDOC





- Dietary Evaluation


Malnutrition Findings (Please click <Entered> for more info): 








Nutritional Asmnt/Malnutrition                             Start:  19 08:

50


Text:                                                      Status: Complete    

  


Freq:                                                                          

  


Protocol:                                                                      

  


 Document     19 08:52  MIGUEL  (Rec: 19 09:02  MIGUEL  [id number])


 Nutritional Asmnt/Malnutrition


     Patient General Information


      Nutritional Screening                      Moderate Risk


      Diagnosis                                  psychosis


      Pertinent Medical Hx/Surgical Hx           pt denied per H&P


      Subjective Information                     Pt seen sitting in dining room


                                                 chatting with other patient.


                                                 Per EMR, PO intake 100%.


      Current Diet Order/ Nutrition Support      soft chopped


      Pertinent Medications                      theragran


      Pertinent Labs                             no labs available


     Nutritional Hx/Data


      Height                                     1.63 m


      Height (Calculated Centimeters)            162.6


      Current Weight (lbs)                       72.575 kg


      Weight (Calculated Kilograms)              72.6


      Weight (Calculated Grams)                  12006.8


      Body Mass Index (BMI)                      27.4


     GI Symptoms


      GI Symptoms                                None


      Last BM                                    


      Difficult in:                              None


      Skin Integrity/Comment:                    intact


      Current %PO                                Good (%)


     Estimated Nutritional Goals


      BEE in Kcals:                              Using Current wt


      Calories/Kcals/Kg                          23-27


      Kcals Calculated                           7027-5925


      Protein:                                   Using Current wt


      Protein g/k.8


      Protein Calculated                         58


      Fluid: ml                                  1679-1971ml (1ml/kcal)


     Nutritional Problem


      No current Nutrition Prob


       Problem                                   N/A


     Malnutrition Alert


      Is there a minimum of two criteria         No


       selected?                                 


       Query Text:Check all the applicable       


       criteria. A minimum of two criteria are   


       recommended for diagnosis of either       


       severe or non-severe malnutrition.        


     Malnutrition Related to Morbid Obesity


      Malnutrition related to morbid obesity     No


     Intervention/Recommendation


      Comments                                   1. Continue with soft chopped


                                                 diet as ordered.


                                                 2. Monitor PO intake, wt, labs


                                                 and skin integrity


                                                 3. F/U as low risk in 7 days


     Expected Outcomes/Goals


      Expected Outcomes/Goals                    1. PO intake to meet at least


                                                 75% of nutritional needs.


                                                 2. Wt stability, skin to


                                                 remain intact, labs to


                                                 approach WNL.

## 2019-04-21 RX ADMIN — HALOPERIDOL SCH MG: 2 SOLUTION ORAL at 14:28

## 2019-04-21 RX ADMIN — HALOPERIDOL SCH MG: 2 SOLUTION ORAL at 20:12

## 2019-04-21 RX ADMIN — HALOPERIDOL SCH MG: 2 SOLUTION ORAL at 09:19

## 2019-04-21 RX ADMIN — ALUMINUM HYDROXIDE, MAGNESIUM HYDROXIDE, AND SIMETHICONE PRN ML: 200; 200; 20 SUSPENSION ORAL at 20:12

## 2019-04-21 NOTE — PROGRESS NOTES
DATE:  04/20/2019



Covering for Dr. Hdz.



Case was discussed with staff of the patient, reviewed records.  This is a

68-year-old female who was admitted on 04/12/2019 on a hold for danger to self,

grave disability.  The patient was running in and out of the roads, exposing

herself to danger situation.  She told  that there is a pot hole in the

street and that she was walking in the street to fix it.  The patient was also

jumping in and out of traffic, and was unable to care for herself.  The patient

with a history of schizophrenia, was paranoid, suspicious, not taking her

medication.  The patient continues to have poor insight, continues to be

anxious, irritable, continues to be restless, easily agitated, intrusive, angry,

yelling at times.  She has been compliant with the medication with no side

effects, no sedation, no nausea, no extrapyramidal symptoms.  She is on Cogentin

1 mg twice a day, Haldol 5 mg 3 times a day, Risperdal 2 mg at bedtime and 1 mg

twice a day, trazodone 50 mg at bedtime.  I will continue the patient in group

therapy, milieu therapy, and adjust medication as needed.





DD: 04/20/2019 14:02

DT: 04/21/2019 04:41

JOB# [id number]  [id number]

## 2019-04-22 RX ADMIN — HALOPERIDOL SCH MG: 2 SOLUTION ORAL at 20:34

## 2019-04-22 RX ADMIN — HALOPERIDOL SCH MG: 2 SOLUTION ORAL at 08:54

## 2019-04-22 RX ADMIN — HALOPERIDOL SCH MG: 2 SOLUTION ORAL at 14:22

## 2019-04-22 RX ADMIN — ALUMINUM HYDROXIDE, MAGNESIUM HYDROXIDE, AND SIMETHICONE PRN ML: 200; 200; 20 SUSPENSION ORAL at 09:59

## 2019-04-22 RX ADMIN — ALUMINUM HYDROXIDE, MAGNESIUM HYDROXIDE, AND SIMETHICONE PRN ML: 200; 200; 20 SUSPENSION ORAL at 14:29

## 2019-04-22 NOTE — PROGRESS NOTES
DATE:  04/22/2019



SUBJECTIVE:  Case was discussed with staff of the patient, reviewed records. 

The patient continues to look disheveled, disorganized, and internally

preoccupied.  Continues to have poor insight.  Unable to make safe plan for

self-care.  Staff is working on placement for this patient.  She is sleeping

better, eating better.  No side effects from the medication, no sedation, no

nausea, no extrapyramidal symptoms.  We will continue to work with the patient

in group therapy, milieu therapy, and adjust medication as needed.





DD: 04/22/2019 12:40

DT: 04/22/2019 22:52

JOB# [id number]  [id number]

## 2019-04-23 RX ADMIN — ALUMINUM HYDROXIDE, MAGNESIUM HYDROXIDE, AND SIMETHICONE PRN ML: 200; 200; 20 SUSPENSION ORAL at 02:29

## 2019-04-23 RX ADMIN — HALOPERIDOL SCH MG: 2 SOLUTION ORAL at 09:23

## 2019-04-23 RX ADMIN — ALUMINUM HYDROXIDE, MAGNESIUM HYDROXIDE, AND SIMETHICONE PRN ML: 200; 200; 20 SUSPENSION ORAL at 20:17

## 2019-04-23 NOTE — PROGRESS NOTES
DATE:     04/23/2019



Case was discussed with staff of the patient, reviewed records.  The patient

continues to be disorganized.  today, she asked me to get out of her face. 

Continues to have poor insight.  Unable to make safe plan for herself,

unpredictable, impulsive, and needing redirection.  She is also working on

placement for this patient and no sedation, no nausea, no extrapyramidal

symptoms.  We will continue to work with the patient in group therapy, milieu

therapy, and adjust the medication as needed.





DD: 04/23/2019 12:09

DT: 04/23/2019 13:23

JOB# [id number]  [id number]

[email or web address]

## 2019-04-27 NOTE — DISCHARGE SUMMARY
DATE OF DISCHARGE:  04/24/2019



PATIENT'S AGE:  68-year-old.



SEX:  Female.



PHYSICIAN:  Dr. Hdz.



FINAL DIAGNOSES:  Primary diagnoses:  Bipolar disorder, manic episode, severe,

with psychotic features.



REASON FOR HOSPITALIZATION:  The patient was admitted to the hospital because of

increased agitation, increased irritability and also she was having thoughts of

suicide and she was placed on hold for dangers to self and grave disability.



HOSPITAL COURSE:  The patient continued to be agitated and restless.  The

patient also continued to be intrusive to others and she was in angry mood and

having severe mood swings.  She also was not able to comply with taking her

medications in the beginning of the treatment, but the patient later on agreed

to take Risperdal and is off Seroquel and the dose adjusted to 2 mg at bedtime

and 1 mg twice a day.  The patient also was given trazodone 50 mg at bedtime. 

The patient was still agitated and in irritable mood.  The patient was given

Haldol in a dose of 5 mg 3 times a day as well as Cogentin 1 mg twice a day. 

Gradually, the patient's affect was brighter.  The patient was calmer and was

easier to redirect her.  The patient also was accepted in [facility] to continue her treatment there and the patient was discharged there.



Physical exam of the patient showed no major medical problem while in the

hospital.  The patient was easier to redirect and she was able to follow up with

the instructions for the discharge and the patient was discharged there.



AFTER DISCHARGE PLANS:  The patient went to [facility] with plans to

follow her up there.



EXPECTED OUTCOME AFTER DISCHARGE:  Fair if the patient continued to take her

psychotropic medications and follow up with discharge plans.





DD: 04/26/2019 17:56

DT: 04/27/2019 00:15

[facility]# [id number]  [id number]

## 2022-04-12 NOTE — NUR
RECEIVED PATIENT AWAKE, PATIENT IN GOOD, HOWEVER EASILY IRRITABLE, AMBULATORY SELF CARE, 
PATIENT ON NON SKID SOCKS AT ALL TIMES, COMPLIANT TO MEDICATION, PATIENT VERBALIZES OF PAIN, 
PRN MEDS GIVEN AS ORDERED, WILL CONTINUE MONITOR same as above

## 2023-12-14 NOTE — NUR
Acute, uncontrolled. Discussed in detail, rebound covid. No indication to repeat antivirals. No indication for antibiotics. Use albuterol every 6 hrs while awake and ill then return to prn. Start prednisone if having SOB or worsening cough over the weekend.      Supportive care reviewed:  Humidified air  Honey lemon tea  Nasal rinse prn  Flonase daily  NSAIDs/ tylenol for pain and fever  Mucinex prn for congestion   covid transmission precautions reviewed   Check pulse ox and go to ED/ urgent care if drops below 92% and/ or uncontrolled worsening dyspnea Awake, pacing the hallway and very needy. Snack, new gown and pajama provided. Patient 
claims she has been having diarrhea. Medicated with Imodium. Also medicated with Klonopin 
and Tylenol per request.